# Patient Record
Sex: MALE | Race: WHITE | NOT HISPANIC OR LATINO | URBAN - METROPOLITAN AREA
[De-identification: names, ages, dates, MRNs, and addresses within clinical notes are randomized per-mention and may not be internally consistent; named-entity substitution may affect disease eponyms.]

---

## 2018-01-06 ENCOUNTER — INPATIENT (INPATIENT)
Facility: HOSPITAL | Age: 83
LOS: 4 days | Discharge: NURSING, REHAB OR LONG-TERM CARE | DRG: 593 | End: 2018-01-11
Attending: INTERNAL MEDICINE | Admitting: INTERNAL MEDICINE
Payer: MEDICARE

## 2018-01-06 VITALS
SYSTOLIC BLOOD PRESSURE: 160 MMHG | DIASTOLIC BLOOD PRESSURE: 78 MMHG | HEART RATE: 76 BPM | WEIGHT: 175.05 LBS | OXYGEN SATURATION: 99 % | TEMPERATURE: 98 F | RESPIRATION RATE: 18 BRPM | HEIGHT: 70 IN

## 2018-01-06 NOTE — ED ADULT NURSE NOTE - ED STAT RN HANDOFF DETAILS 2
Report received from BRANDON Proctor RN. Patient resting in bed. AA&Ox3. Breathing on room air. IVHL intact.

## 2018-01-07 DIAGNOSIS — L03.90 CELLULITIS, UNSPECIFIED: ICD-10-CM

## 2018-01-07 DIAGNOSIS — Z29.9 ENCOUNTER FOR PROPHYLACTIC MEASURES, UNSPECIFIED: ICD-10-CM

## 2018-01-07 DIAGNOSIS — L89.90 PRESSURE ULCER OF UNSPECIFIED SITE, UNSPECIFIED STAGE: ICD-10-CM

## 2018-01-07 DIAGNOSIS — Z86.79 PERSONAL HISTORY OF OTHER DISEASES OF THE CIRCULATORY SYSTEM: ICD-10-CM

## 2018-01-07 LAB
ALBUMIN SERPL ELPH-MCNC: 3.3 G/DL — LOW (ref 3.5–5)
ALP SERPL-CCNC: 103 U/L — SIGNIFICANT CHANGE UP (ref 40–120)
ALT FLD-CCNC: 13 U/L DA — SIGNIFICANT CHANGE UP (ref 10–60)
ANION GAP SERPL CALC-SCNC: 6 MMOL/L — SIGNIFICANT CHANGE UP (ref 5–17)
APTT BLD: 27.7 SEC — SIGNIFICANT CHANGE UP (ref 27.5–37.4)
AST SERPL-CCNC: 19 U/L — SIGNIFICANT CHANGE UP (ref 10–40)
BASOPHILS NFR BLD AUTO: 1 % — SIGNIFICANT CHANGE UP (ref 0–2)
BILIRUB SERPL-MCNC: 0.2 MG/DL — SIGNIFICANT CHANGE UP (ref 0.2–1.2)
BUN SERPL-MCNC: 18 MG/DL — SIGNIFICANT CHANGE UP (ref 7–18)
CALCIUM SERPL-MCNC: 8.7 MG/DL — SIGNIFICANT CHANGE UP (ref 8.4–10.5)
CHLORIDE SERPL-SCNC: 103 MMOL/L — SIGNIFICANT CHANGE UP (ref 96–108)
CO2 SERPL-SCNC: 31 MMOL/L — SIGNIFICANT CHANGE UP (ref 22–31)
CREAT SERPL-MCNC: 0.62 MG/DL — SIGNIFICANT CHANGE UP (ref 0.5–1.3)
EOSINOPHIL NFR BLD AUTO: 1 % — SIGNIFICANT CHANGE UP (ref 0–6)
GLUCOSE SERPL-MCNC: 93 MG/DL — SIGNIFICANT CHANGE UP (ref 70–99)
HCT VFR BLD CALC: 32.8 % — LOW (ref 39–50)
HGB BLD-MCNC: 10.1 G/DL — LOW (ref 13–17)
INR BLD: 1 RATIO — SIGNIFICANT CHANGE UP (ref 0.88–1.16)
LACTATE SERPL-SCNC: 1 MMOL/L — SIGNIFICANT CHANGE UP (ref 0.7–2)
LYMPHOCYTES # BLD AUTO: 46 % — HIGH (ref 13–44)
MCHC RBC-ENTMCNC: 29.2 PG — SIGNIFICANT CHANGE UP (ref 27–34)
MCHC RBC-ENTMCNC: 30.7 GM/DL — LOW (ref 32–36)
MCV RBC AUTO: 95.3 FL — SIGNIFICANT CHANGE UP (ref 80–100)
MONOCYTES NFR BLD AUTO: 5 % — SIGNIFICANT CHANGE UP (ref 2–14)
NEUTROPHILS NFR BLD AUTO: 44 % — SIGNIFICANT CHANGE UP (ref 43–77)
PLATELET # BLD AUTO: 387 K/UL — SIGNIFICANT CHANGE UP (ref 150–400)
POTASSIUM SERPL-MCNC: 4.1 MMOL/L — SIGNIFICANT CHANGE UP (ref 3.5–5.3)
POTASSIUM SERPL-SCNC: 4.1 MMOL/L — SIGNIFICANT CHANGE UP (ref 3.5–5.3)
PROT SERPL-MCNC: 6.6 G/DL — SIGNIFICANT CHANGE UP (ref 6–8.3)
PROTHROM AB SERPL-ACNC: 10.9 SEC — SIGNIFICANT CHANGE UP (ref 9.8–12.7)
RBC # BLD: 3.44 M/UL — LOW (ref 4.2–5.8)
RBC # FLD: 15.4 % — HIGH (ref 10.3–14.5)
SODIUM SERPL-SCNC: 140 MMOL/L — SIGNIFICANT CHANGE UP (ref 135–145)
WBC # BLD: 14.7 K/UL — HIGH (ref 3.8–10.5)
WBC # FLD AUTO: 14.7 K/UL — HIGH (ref 3.8–10.5)

## 2018-01-07 PROCEDURE — 73502 X-RAY EXAM HIP UNI 2-3 VIEWS: CPT | Mod: 26,RT

## 2018-01-07 PROCEDURE — 99285 EMERGENCY DEPT VISIT HI MDM: CPT | Mod: 25

## 2018-01-07 RX ORDER — PIPERACILLIN AND TAZOBACTAM 4; .5 G/20ML; G/20ML
3.38 INJECTION, POWDER, LYOPHILIZED, FOR SOLUTION INTRAVENOUS ONCE
Qty: 0 | Refills: 0 | Status: COMPLETED | OUTPATIENT
Start: 2018-01-07 | End: 2018-01-07

## 2018-01-07 RX ORDER — FERROUS SULFATE 325(65) MG
0 TABLET ORAL
Qty: 0 | Refills: 0 | COMMUNITY

## 2018-01-07 RX ORDER — FERROUS SULFATE 325(65) MG
325 TABLET ORAL DAILY
Qty: 0 | Refills: 0 | Status: DISCONTINUED | OUTPATIENT
Start: 2018-01-07 | End: 2018-01-11

## 2018-01-07 RX ORDER — ZINC GLUCONATE 30 MG
0 TABLET ORAL
Qty: 0 | Refills: 0 | COMMUNITY

## 2018-01-07 RX ORDER — LISINOPRIL 2.5 MG/1
10 TABLET ORAL DAILY
Qty: 0 | Refills: 0 | Status: DISCONTINUED | OUTPATIENT
Start: 2018-01-07 | End: 2018-01-11

## 2018-01-07 RX ORDER — FUROSEMIDE 40 MG
0 TABLET ORAL
Qty: 0 | Refills: 0 | COMMUNITY

## 2018-01-07 RX ORDER — AMPICILLIN SODIUM AND SULBACTAM SODIUM 250; 125 MG/ML; MG/ML
3 INJECTION, POWDER, FOR SUSPENSION INTRAMUSCULAR; INTRAVENOUS EVERY 6 HOURS
Qty: 0 | Refills: 0 | Status: DISCONTINUED | OUTPATIENT
Start: 2018-01-07 | End: 2018-01-11

## 2018-01-07 RX ORDER — VANCOMYCIN HCL 1 G
1000 VIAL (EA) INTRAVENOUS ONCE
Qty: 0 | Refills: 0 | Status: COMPLETED | OUTPATIENT
Start: 2018-01-07 | End: 2018-01-07

## 2018-01-07 RX ORDER — LIDOCAINE 4 G/100G
1 CREAM TOPICAL DAILY
Qty: 0 | Refills: 0 | Status: DISCONTINUED | OUTPATIENT
Start: 2018-01-07 | End: 2018-01-11

## 2018-01-07 RX ORDER — TAMSULOSIN HYDROCHLORIDE 0.4 MG/1
0 CAPSULE ORAL
Qty: 0 | Refills: 0 | COMMUNITY

## 2018-01-07 RX ORDER — AMPICILLIN SODIUM AND SULBACTAM SODIUM 250; 125 MG/ML; MG/ML
INJECTION, POWDER, FOR SUSPENSION INTRAMUSCULAR; INTRAVENOUS
Qty: 0 | Refills: 0 | Status: DISCONTINUED | OUTPATIENT
Start: 2018-01-07 | End: 2018-01-07

## 2018-01-07 RX ORDER — LISINOPRIL 2.5 MG/1
0 TABLET ORAL
Qty: 0 | Refills: 0 | COMMUNITY

## 2018-01-07 RX ORDER — TAMSULOSIN HYDROCHLORIDE 0.4 MG/1
0.4 CAPSULE ORAL AT BEDTIME
Qty: 0 | Refills: 0 | Status: DISCONTINUED | OUTPATIENT
Start: 2018-01-07 | End: 2018-01-11

## 2018-01-07 RX ORDER — AMPICILLIN SODIUM AND SULBACTAM SODIUM 250; 125 MG/ML; MG/ML
3 INJECTION, POWDER, FOR SUSPENSION INTRAMUSCULAR; INTRAVENOUS ONCE
Qty: 0 | Refills: 0 | Status: DISCONTINUED | OUTPATIENT
Start: 2018-01-07 | End: 2018-01-07

## 2018-01-07 RX ORDER — FUROSEMIDE 40 MG
20 TABLET ORAL DAILY
Qty: 0 | Refills: 0 | Status: DISCONTINUED | OUTPATIENT
Start: 2018-01-07 | End: 2018-01-11

## 2018-01-07 RX ORDER — ZINC GLUCONATE 30 MG
50 TABLET ORAL
Qty: 0 | Refills: 0 | Status: DISCONTINUED | OUTPATIENT
Start: 2018-01-07 | End: 2018-01-07

## 2018-01-07 RX ORDER — ZINC SULFATE TAB 220 MG (50 MG ZINC EQUIVALENT) 220 (50 ZN) MG
220 TAB ORAL
Qty: 0 | Refills: 0 | Status: DISCONTINUED | OUTPATIENT
Start: 2018-01-07 | End: 2018-01-11

## 2018-01-07 RX ORDER — AMPICILLIN SODIUM AND SULBACTAM SODIUM 250; 125 MG/ML; MG/ML
3 INJECTION, POWDER, FOR SUSPENSION INTRAMUSCULAR; INTRAVENOUS ONCE
Qty: 0 | Refills: 0 | Status: COMPLETED | OUTPATIENT
Start: 2018-01-07 | End: 2018-01-07

## 2018-01-07 RX ORDER — ENOXAPARIN SODIUM 100 MG/ML
40 INJECTION SUBCUTANEOUS DAILY
Qty: 0 | Refills: 0 | Status: DISCONTINUED | OUTPATIENT
Start: 2018-01-07 | End: 2018-01-11

## 2018-01-07 RX ORDER — AMPICILLIN SODIUM AND SULBACTAM SODIUM 250; 125 MG/ML; MG/ML
3 INJECTION, POWDER, FOR SUSPENSION INTRAMUSCULAR; INTRAVENOUS EVERY 6 HOURS
Qty: 0 | Refills: 0 | Status: DISCONTINUED | OUTPATIENT
Start: 2018-01-07 | End: 2018-01-07

## 2018-01-07 RX ADMIN — Medication 20 MILLIGRAM(S): at 13:04

## 2018-01-07 RX ADMIN — TAMSULOSIN HYDROCHLORIDE 0.4 MILLIGRAM(S): 0.4 CAPSULE ORAL at 21:14

## 2018-01-07 RX ADMIN — Medication 325 MILLIGRAM(S): at 13:04

## 2018-01-07 RX ADMIN — PIPERACILLIN AND TAZOBACTAM 100 GRAM(S): 4; .5 INJECTION, POWDER, LYOPHILIZED, FOR SOLUTION INTRAVENOUS at 06:15

## 2018-01-07 RX ADMIN — LIDOCAINE 1 PATCH: 4 CREAM TOPICAL at 13:04

## 2018-01-07 RX ADMIN — ENOXAPARIN SODIUM 40 MILLIGRAM(S): 100 INJECTION SUBCUTANEOUS at 13:04

## 2018-01-07 RX ADMIN — AMPICILLIN SODIUM AND SULBACTAM SODIUM 500 GRAM(S): 250; 125 INJECTION, POWDER, FOR SUSPENSION INTRAMUSCULAR; INTRAVENOUS at 13:04

## 2018-01-07 RX ADMIN — Medication 250 MILLIGRAM(S): at 09:26

## 2018-01-07 RX ADMIN — AMPICILLIN SODIUM AND SULBACTAM SODIUM 500 GRAM(S): 250; 125 INJECTION, POWDER, FOR SUSPENSION INTRAMUSCULAR; INTRAVENOUS at 17:40

## 2018-01-07 RX ADMIN — LISINOPRIL 10 MILLIGRAM(S): 2.5 TABLET ORAL at 13:04

## 2018-01-07 NOTE — ED PROVIDER NOTE - SKIN WOUND TYPE
PRESSURE ULCER(S)/(+) edge of surgical wound on R hip: 3mm opening with surrounding erythema, pressure ulcer over sacrum PRESSURE ULCER(S)/(+) erythema edge of surgical wound on R hip: 3mm opening with surrounding erythema(2cm), pressure ulcer over sacrum

## 2018-01-07 NOTE — ED PROVIDER NOTE - OBJECTIVE STATEMENT
89 y/o male with significant PMHx of HTN, BIB EMS from [facility] to [facility] ED to r/o R hip/buttock abscess x today. Today wife got call from NH: stating that during dressing of surgical wound on R hip, there was drainage from R hip surgical wound, and was bought to NH to r/o abscess. Wife relates on 11/7/2017 pt had partial hip replacement at [facility], and had a complication with PNA and episode of sepsis 5 days s/p surgery. Wife states pt was admitted at CCU was put on chest tube for secretions due to PNA, and had fluid drained for 5 days. On 11/28/17 s/p removal of chest tube, pt was in [facility] for on physical therapy for R knee which as been chronically pain for many years. Pt denies fever, chills, CP, SOB, or any other complaints. PMD: Dr. Brown 89 y/o male with significant PMHx of HTN, CLL (WBC normally between [id number]) BIB EMS from NH to the ED to r/o R hip/buttock wound x today. Today wife got call from NH: stating that during dressing of surgical wound on R hip, there was drainage from R hip surgical wound, and was bought to NH to r/o abscess. Wife relates on 11/7/2017 pt had partial hip replacement at [facility], and had a complication with PNA and episode of sepsis 5 days s/p surgery. Wife states pt was admitted at CCU was put on chest tube for secretions due to PNA, and had fluid drained for 5 days. On 11/28/17 s/p removal of chest tube, pt was in [facility] for on physical therapy for R knee which as been chronically pain for many years. Pt denies fever, chills, CP, SOB, or any other complaints. PMD: Dr. Brown 89 y/o male with significant PMHx of HTN, CLL (WBC normally between 11-71072) BIB EMS from NH to the ED to r/o R hip/buttock wound x today. Today wife got call from NH: stating that during dressing of surgical wound on R hip, there was drainage from R hip surgical wound, and was bought to NH to r/o abscess. Wife relates on 11/7/2017 pt had partial hip replacement at [facility], and had a complication with PNA and episode of sepsis 5 days s/p surgery. Wife states pt was admitted at CCU was put on chest tube for secretions due to PNA, and had fluid drained for 5 days. On 11/28/17 s/p removal of chest tube, pt was in [facility] for on physical therapy for R knee which as been chronically pain for many years. Pt denies fever, chills, CP, SOB, or any other complaints. Orthopedist: Dr. Brown

## 2018-01-07 NOTE — H&P ADULT - NSHPPHYSICALEXAM_GEN_ALL_CORE
PHYSICAL EXAM:    GENERAL: NAD, well-developed    HEAD:  Atraumatic, Normocephalic    EYES: EOMI, PERRLA, conjunctiva and sclera clear    NECK: Supple, No JVD    CHEST/LUNG: Clear to auscultation bilaterally; No wheeze    HEART: Regular rate and rhythm; No murmurs, rubs, or gallops    ABDOMEN: Soft, Nontender, Nondistended; Bowel sounds present    EXTREMITIES:  2+ Peripheral Pulses, No clubbing, cyanosis, or edema    PSYCH: AAOx3    NEUROLOGY: non-focal    SKIN: right hip stage 3 pressure ulcer 2x2 cm with minimal purulent discharge and upper back stage 2 pressure ulcer 1x2 cm without any discharge    No other pertinent positive finding except mentioned as above.

## 2018-01-07 NOTE — H&P ADULT - ASSESSMENT
89 y/o male from [facility] with significant PMHx of HTN, CLL (WBC normally between [id number]) BIB EMS from NH to the ED to r/o R hip/buttock wound x today

## 2018-01-07 NOTE — H&P ADULT - NSHPREVIEWOFSYSTEMS_GEN_ALL_CORE
REVIEW OF SYSTEMS:    CONSTITUTIONAL: No  fevers or chills  EYES/ENT: No visual changes;  No vertigo or throat pain   NECK: No pain or stiffness  RESPIRATORY: No cough, wheezing, hemoptysis; No shortness of breath  CARDIOVASCULAR: No chest pain or palpitations  GASTROINTESTINAL: No abdominal or epigastric pain. No nausea, vomiting, or hematemesis; No diarrhea or constipation. No melena or hematochezia.  GENITOURINARY: No dysuria, frequency or hematuria  NEUROLOGICAL: No numbness or weakness  SKIN: No itching, rashes, 2 pressure ulcer at the back and hip as mentioned above, scar from hip surgery heeled well  No other complaint except mentioned as above.

## 2018-01-07 NOTE — H&P ADULT - NSHPLABSRESULTS_GEN_ALL_CORE
Vital Signs Last 24 Hrs  T(C): 36.6 (07 Jan 2018 10:35), Max: 37.1 (07 Jan 2018 00:51)  T(F): 97.8 (07 Jan 2018 10:35), Max: 98.8 (07 Jan 2018 00:51)  HR: 81 (07 Jan 2018 10:35) (76 - 81)  BP: 148/49 (07 Jan 2018 10:35) (118/41 - 160/78)  BP(mean): --  RR: 18 (07 Jan 2018 10:35) (18 - 18)  SpO2: 98% (07 Jan 2018 10:35) (98% - 100%)      Labs:                        10.1   14.7  )-----------( 387      ( 07 Jan 2018 01:27 )             32.8     01-07    140  |  103  |  18  ----------------------------<  93  4.1   |  31  |  0.62    Ca    8.7      07 Jan 2018 01:27    TPro  6.6  /  Alb  3.3<L>  /  TBili  0.2  /  DBili  x   /  AST  19  /  ALT  13  /  AlkPhos  103  01-07

## 2018-01-07 NOTE — H&P ADULT - HISTORY OF PRESENT ILLNESS
89 y/o male from [facility] with significant PMHx of HTN, CLL (WBC normally between [id number]) BIB EMS from [facility] to the ED to r/o R hip/buttock wound x today. Patient noticed serous drainage from his right hip wound today during dressing and daughter was notified and patient was brought in to ED. He also admit having another pressure ulcer at the middle of the back.   Daughter stated that on 11/7/2017 pt had partial hip replacement at [facility], and had a complication with PNA and episode of sepsis 5 days s/p surgery. Pt was admitted at CCU was put on chest tube for secretions due to PNA, and had fluid drained for 5 days. On 11/28/17 s/p removal of chest tube, pt was in [facility] for on physical therapy for R knee which as been chronically pain for many years from arthritis. He is improving and using rollator at rehab.    Pt denies fever, chills, CP, SOB, or any other complaints. Orthopedist: Dr. Brown

## 2018-01-07 NOTE — H&P ADULT - PROBLEM SELECTOR PLAN 1
-stage 3 hip pressure ulcer and stage 2 upper back ulcer  -WBC 14 (likely from underlying CLL) denied any fever  Will give Unasyn, s/p vancomycin and zosyn in ED  -f/u BCx and wound care  -frequent turning every 2 hour

## 2018-01-08 LAB
ANION GAP SERPL CALC-SCNC: 7 MMOL/L — SIGNIFICANT CHANGE UP (ref 5–17)
BASOPHILS # BLD AUTO: 0.1 K/UL — SIGNIFICANT CHANGE UP (ref 0–0.2)
BASOPHILS NFR BLD AUTO: 1 % — SIGNIFICANT CHANGE UP (ref 0–2)
BUN SERPL-MCNC: 12 MG/DL — SIGNIFICANT CHANGE UP (ref 7–18)
CALCIUM SERPL-MCNC: 8.6 MG/DL — SIGNIFICANT CHANGE UP (ref 8.4–10.5)
CHLORIDE SERPL-SCNC: 102 MMOL/L — SIGNIFICANT CHANGE UP (ref 96–108)
CHOLEST SERPL-MCNC: 157 MG/DL — SIGNIFICANT CHANGE UP (ref 10–199)
CO2 SERPL-SCNC: 30 MMOL/L — SIGNIFICANT CHANGE UP (ref 22–31)
CREAT SERPL-MCNC: 0.64 MG/DL — SIGNIFICANT CHANGE UP (ref 0.5–1.3)
EOSINOPHIL # BLD AUTO: 0.3 K/UL — SIGNIFICANT CHANGE UP (ref 0–0.5)
EOSINOPHIL NFR BLD AUTO: 2.3 % — SIGNIFICANT CHANGE UP (ref 0–6)
ERYTHROCYTE [SEDIMENTATION RATE] IN BLOOD: 35 MM/HR — HIGH (ref 0–20)
GLUCOSE SERPL-MCNC: 86 MG/DL — SIGNIFICANT CHANGE UP (ref 70–99)
HBA1C BLD-MCNC: 4.8 % — SIGNIFICANT CHANGE UP (ref 4–5.6)
HCT VFR BLD CALC: 30.2 % — LOW (ref 39–50)
HDLC SERPL-MCNC: 52 MG/DL — SIGNIFICANT CHANGE UP (ref 40–125)
HGB BLD-MCNC: 9.7 G/DL — LOW (ref 13–17)
LIPID PNL WITH DIRECT LDL SERPL: 89 MG/DL — SIGNIFICANT CHANGE UP
LYMPHOCYTES # BLD AUTO: 55.6 % — HIGH (ref 13–44)
LYMPHOCYTES # BLD AUTO: 6.4 K/UL — HIGH (ref 1–3.3)
MAGNESIUM SERPL-MCNC: 2.2 MG/DL — SIGNIFICANT CHANGE UP (ref 1.6–2.6)
MCHC RBC-ENTMCNC: 30.8 PG — SIGNIFICANT CHANGE UP (ref 27–34)
MCHC RBC-ENTMCNC: 32 GM/DL — SIGNIFICANT CHANGE UP (ref 32–36)
MCV RBC AUTO: 96.3 FL — SIGNIFICANT CHANGE UP (ref 80–100)
MONOCYTES # BLD AUTO: 0.6 K/UL — SIGNIFICANT CHANGE UP (ref 0–0.9)
MONOCYTES NFR BLD AUTO: 5.4 % — SIGNIFICANT CHANGE UP (ref 2–14)
NEUTROPHILS # BLD AUTO: 4.1 K/UL — SIGNIFICANT CHANGE UP (ref 1.8–7.4)
NEUTROPHILS NFR BLD AUTO: 35.7 % — LOW (ref 43–77)
PHOSPHATE SERPL-MCNC: 3.6 MG/DL — SIGNIFICANT CHANGE UP (ref 2.5–4.5)
PLATELET # BLD AUTO: 347 K/UL — SIGNIFICANT CHANGE UP (ref 150–400)
POTASSIUM SERPL-MCNC: 3.5 MMOL/L — SIGNIFICANT CHANGE UP (ref 3.5–5.3)
POTASSIUM SERPL-SCNC: 3.5 MMOL/L — SIGNIFICANT CHANGE UP (ref 3.5–5.3)
RBC # BLD: 3.14 M/UL — LOW (ref 4.2–5.8)
RBC # FLD: 15.2 % — HIGH (ref 10.3–14.5)
SODIUM SERPL-SCNC: 139 MMOL/L — SIGNIFICANT CHANGE UP (ref 135–145)
TOTAL CHOLESTEROL/HDL RATIO MEASUREMENT: 3 RATIO — LOW (ref 3.4–9.6)
TRIGL SERPL-MCNC: 79 MG/DL — SIGNIFICANT CHANGE UP (ref 10–149)
TSH SERPL-MCNC: 2.23 UU/ML — SIGNIFICANT CHANGE UP (ref 0.34–4.82)
WBC # BLD: 11.5 K/UL — HIGH (ref 3.8–10.5)
WBC # FLD AUTO: 11.5 K/UL — HIGH (ref 3.8–10.5)

## 2018-01-08 RX ORDER — VANCOMYCIN HCL 1 G
1000 VIAL (EA) INTRAVENOUS ONCE
Qty: 0 | Refills: 0 | Status: COMPLETED | OUTPATIENT
Start: 2018-01-08 | End: 2018-01-08

## 2018-01-08 RX ORDER — VANCOMYCIN HCL 1 G
VIAL (EA) INTRAVENOUS
Qty: 0 | Refills: 0 | Status: DISCONTINUED | OUTPATIENT
Start: 2018-01-08 | End: 2018-01-09

## 2018-01-08 RX ORDER — IBUPROFEN 200 MG
400 TABLET ORAL EVERY 4 HOURS
Qty: 0 | Refills: 0 | Status: DISCONTINUED | OUTPATIENT
Start: 2018-01-08 | End: 2018-01-11

## 2018-01-08 RX ORDER — VANCOMYCIN HCL 1 G
1000 VIAL (EA) INTRAVENOUS EVERY 24 HOURS
Qty: 0 | Refills: 0 | Status: DISCONTINUED | OUTPATIENT
Start: 2018-01-09 | End: 2018-01-09

## 2018-01-08 RX ADMIN — Medication 250 MILLIGRAM(S): at 18:11

## 2018-01-08 RX ADMIN — AMPICILLIN SODIUM AND SULBACTAM SODIUM 500 GRAM(S): 250; 125 INJECTION, POWDER, FOR SUSPENSION INTRAMUSCULAR; INTRAVENOUS at 23:11

## 2018-01-08 RX ADMIN — TAMSULOSIN HYDROCHLORIDE 0.4 MILLIGRAM(S): 0.4 CAPSULE ORAL at 23:11

## 2018-01-08 RX ADMIN — AMPICILLIN SODIUM AND SULBACTAM SODIUM 500 GRAM(S): 250; 125 INJECTION, POWDER, FOR SUSPENSION INTRAMUSCULAR; INTRAVENOUS at 00:35

## 2018-01-08 RX ADMIN — ZINC SULFATE TAB 220 MG (50 MG ZINC EQUIVALENT) 220 MILLIGRAM(S): 220 (50 ZN) TAB at 12:06

## 2018-01-08 RX ADMIN — AMPICILLIN SODIUM AND SULBACTAM SODIUM 500 GRAM(S): 250; 125 INJECTION, POWDER, FOR SUSPENSION INTRAMUSCULAR; INTRAVENOUS at 05:37

## 2018-01-08 RX ADMIN — AMPICILLIN SODIUM AND SULBACTAM SODIUM 500 GRAM(S): 250; 125 INJECTION, POWDER, FOR SUSPENSION INTRAMUSCULAR; INTRAVENOUS at 17:29

## 2018-01-08 RX ADMIN — Medication 20 MILLIGRAM(S): at 05:37

## 2018-01-08 RX ADMIN — Medication 325 MILLIGRAM(S): at 12:06

## 2018-01-08 RX ADMIN — LIDOCAINE 1 PATCH: 4 CREAM TOPICAL at 12:06

## 2018-01-08 RX ADMIN — AMPICILLIN SODIUM AND SULBACTAM SODIUM 500 GRAM(S): 250; 125 INJECTION, POWDER, FOR SUSPENSION INTRAMUSCULAR; INTRAVENOUS at 12:06

## 2018-01-08 RX ADMIN — LISINOPRIL 10 MILLIGRAM(S): 2.5 TABLET ORAL at 05:37

## 2018-01-08 RX ADMIN — Medication 400 MILLIGRAM(S): at 23:41

## 2018-01-08 RX ADMIN — ENOXAPARIN SODIUM 40 MILLIGRAM(S): 100 INJECTION SUBCUTANEOUS at 12:06

## 2018-01-08 RX ADMIN — LIDOCAINE 1 PATCH: 4 CREAM TOPICAL at 00:32

## 2018-01-08 NOTE — CONSULT NOTE ADULT - SUBJECTIVE AND OBJECTIVE BOX
HPI:  89 y/o male from [facility] with significant PMHx of HTN, CLL (WBC normally between [id number]) BIB EMS from NH to the ED to r/o R hip/buttock wound x today. Patient noticed serous drainage from his right hip wound today during dressing and daughter was notified and patient was brought in to ED. He also admit having another pressure ulcer at the middle of the back.   Daughter stated that on 11/7/2017 pt had partial hip replacement at [facility], and had a complication with PNA and episode of sepsis 5 days s/p surgery. Pt was admitted at CCU was put on chest tube for secretions due to PNA, and had fluid drained for 5 days. On 11/28/17 s/p removal of chest tube, pt was in [facility] for on physical therapy for R knee which as been chronically pain for many years from arthritis. He is improving and using rollator at rehab.    Pt denies fever, chills, CP, SOB, or any other complaints. Orthopedist: Dr. Brown (07 Jan 2018 11:02). ID called for eval of infected  right hip wound. denies any pain over right hip wound , no fevers no chills . afebrile on adm with leucocytosis , xray of right hip with no dislocation or loosening .       PAST MEDICAL & SURGICAL HISTORY:  History of hypertension  No significant past surgical history    allergy   No Known Allergies    meds   ampicillin/sulbactam  IVPB 3 Gram(s) IV Intermittent every 6 hours  enoxaparin Injectable 40 milliGRAM(s) SubCutaneous daily  ferrous    sulfate 325 milliGRAM(s) Oral daily  furosemide    Tablet 20 milliGRAM(s) Oral daily  lidocaine   Patch 1 Patch Transdermal daily  lisinopril 10 milliGRAM(s) Oral daily  tamsulosin 0.4 milliGRAM(s) Oral at bedtime  zinc sulfate 220 milliGRAM(s) Oral <User Schedule>      Social Hx:no smoking no etoh     FAMILY HISTORY: non contributory         ROS  [  ] UNABLE TO ELICIT    General:  [ - ] Fever   [-  ] Malaise    Skin: right hip wound draining /also with other decubiti over back     HEENT:  [ - ] Sore Throat  [ - ] Photophobia	    Chest: [ - ] SOB  [ - ] Cough     Cardiovascular: [ - ] CP	no pnd no orthopnea     Gastrointestinal: [  -] Abd pain   [-  ] N    [ - ] V   [ - ] Diarrhea	    Genitourinary: [ - ] Polyuria   [ - ] Urgency   [ - ] Dysuria    [ - ]  Hematuria	    Musculoskeletal:  [ - ] Back Pain	    Neurological: [ - ]Dizziness  [-  ]Visual Disturbance  [ - ]Headaches      PHYSICAL EXAM:    Vital Signs Last 24 Hrs  T(C): 36.3 (08 Jan 2018 14:10), Max: 36.7 (07 Jan 2018 22:47)  T(F): 97.3 (08 Jan 2018 14:10), Max: 98.1 (07 Jan 2018 22:47)  HR: 77 (08 Jan 2018 14:10) (67 - 77)  BP: 111/46 (08 Jan 2018 14:10) (99/62 - 158/53)  BP(mean): --  RR: 18 (08 Jan 2018 14:10) (17 - 18)  SpO2: 95% (08 Jan 2018 14:10) (95% - 99%)    Constitutional: awake alert oriented times 2-3    PRAVEEN SCLERA anicteric EOMI   LUNGS clear   CVS s1 s2 aud no murmurs   ABDOMEN soft non tender no HSM   NEUROLOGY  no defecits   SKIN right post thoracic area with stage 2-3 clean du /sacral du c;lean /left heel with stage 3 du clean   EXTREMITIES no edema no cyanosis /right hip wound with pin pt opening with min serous d/c         LABS/DIAGNOSTIC TESTS                          9.7    11.5  )-----------( 347      ( 08 Jan 2018 07:33 )             30.2     WBC Count: 11.5 K/uL (01-08 @ 07:33)  WBC Count: 14.7 K/uL (01-07 @ 01:27)      01-08    139  |  102  |  12  ----------------------------<  86  3.5   |  30  |  0.64    Ca    8.6      08 Jan 2018 07:33  Phos  3.6     01-08  Mg     2.2     01-08    TPro  6.6  /  Alb  3.3<L>  /  TBili  0.2  /  DBili  x   /  AST  19  /  ALT  13  /  AlkPhos  103  01-07          LIVER FUNCTIONS - ( 07 Jan 2018 01:27 )  Alb: 3.3 g/dL / Pro: 6.6 g/dL / ALK PHOS: 103 U/L / ALT: 13 U/L DA / AST: 19 U/L / GGT: x             PT/INR - ( 07 Jan 2018 01:27 )   PT: 10.9 sec;   INR: 1.00 ratio         PTT - ( 07 Jan 2018 01:27 )  PTT:27.7 sec    LACTATE: Lactate, Blood (01.07.18 @ 01:27)    Lactate, Blood: 1.0 mmol/L          Culture Results:   No growth to date. (01-07 @ 12:07)blood         RADIOLOGY    < from: Xray Hip w/ Pelvis 2 or 3 Views, Right (01.07.18 @ 06:18) >    EXAM:  XR HIP WITH PELV 2-3V RT                            PROCEDURE DATE:  01/07/2018          INTERPRETATION:  History: Hip pain.    Findings: Frontal pelvis and frontal and abducted views of the right hip.    No prior studies for comparison.    Patient has history of right hip arthroplasty. Prosthesis is intact with   good articulation. No periprosthetic fracture seen. There is decreased   bony mineralization commensurate with patient's age. Degenerative changes   visualized lower lumbar spine.    Impression:    History of right hip arthroplasty. Prosthesis is intact with good   articulation. No acute fractures.    < end of copied text >

## 2018-01-08 NOTE — DISCHARGE NOTE ADULT - PLAN OF CARE
Local Wound care and Oral antibiotics Admitted to the floor due suspected wound infection. Evaluated by wound care nurse specialist.  There is a healing Surgical Dehiscence to the R. Hip (0.2cm x 0.2cm with red tissue, pale tissue, and scant drainage. There is a Stage 1 Pressure Injury to the Coccyx; presenting as non-blanchable erythema  There is an Unstageable Pressure Injury to the R. Gluteus (6cm x 4cm) and R. Heel (2cm x 2cm) with slough, pink tissue, and scant drainage.  Clean all wounds with normal saline and apply skin prep to the surrounding skin  Apply Hydrocolloid dressing (Comfeel) to the R. Heel and R. Gluteal wound bed and cover with a Foam dressing Q 72hrs PRN  Apply a Foam dressing to the R. Hip wound Q 72hrs PRN  Apply Zinc Oxide Moisture Barrier Cream to the Perianal and Bilateral Gluteal areas b.i.d. PRN  Elevate/float the patients heels using heel protectors and reposition the patient Q 2hrs using wedges or pillows Continue with blood pressure medication. Maintain a healthy diet that consist of low sugar, low fat, low sodium diet. Exercise frequently if possible.  Follow up with primary care physician in one week after discharge. Admitted to the floor due suspected wound infection. Evaluated by wound care nurse specialist.  There is a healing Surgical Dehiscence to the R. Hip. Stage 1 Pressure Injury to the Coccyx; presenting as non-blanchable erythema and unstageable pressure Injury to the R. Gluteus with slough, pink tissue, and scant drainage. Reccomened clean all wounds with normal saline and apply skin prep to the surrounding skin.  Apply Hydrocolloid dressing (Comfeel) to the R. Heel and R. Gluteal wound bed and cover with a Foam dressing Q 72hrs PRN. Apply a Foam dressing to the R. Hip wound Q 72hrs PRN  Apply Zinc Oxide Moisture Barrier Cream to the Perianal and Bilateral Gluteal areas b.i.d. PRN  Elevate/float the patients heels using heel protectors and reposition the patient Q 2hrs using wedges or pillows. Ambulation is adviced.  Will complete a course of oral antibiotics Augmentin for a total of 10 days. Admitted to the floor due suspected wound infection. Evaluated by wound care nurse specialist.  There is a healing Surgical Dehiscence to the R. Hip. Stage 1 Pressure Injury to the Coccyx; presenting as non-blanchable erythema and unstageable pressure Injury to the R. Gluteus with slough, pink tissue, and scant drainage.     Recommendations include: clean all wounds with normal saline and apply skin prep to the surrounding skin.  Apply Hydrocolloid dressing (Comfeel) to the R. Heel and R. Gluteal wound bed and cover with a Foam dressing Q 72hrs PRN. Apply a Foam dressing to the R. Hip wound Q 72hrs PRN  Apply Zinc Oxide Moisture Barrier Cream to the Perianal and Bilateral Gluteal areas b.i.d. PRN  Elevate/float the patients heels using heel protectors and reposition the patient Q 2hrs using wedges or pillows. Ambulation is adviced.  As per Infectious disease specialist. Will complete a course of oral antibiotics  for a total of 7 more days Doxycyline 100mg oral two times a day

## 2018-01-08 NOTE — DISCHARGE NOTE ADULT - PATIENT PORTAL LINK FT
“You can access the Follow[facility] Patient Portal, offered by [facility], by registering with the following website: [email or web address]”

## 2018-01-08 NOTE — CONSULT NOTE ADULT - ASSESSMENT
89 y/o male from [facility] with significant PMHx of HTN, CLL (WBC normally between [id number]) BIB EMS from [facility] to the ED to r/o R hip/buttock wound x today. Patient noticed serous drainage from his right hip wound today during dressing and daughter was notified and patient was brought in to ED. He also admit having another pressure ulcer at the middle of the back.   Daughter stated that on 11/7/2017 pt had partial hip replacement at [facility], and had a complication with PNA and episode of sepsis 5 days s/p surgery. Pt was admitted at CCU was put on chest tube for secretions due to PNA, and had fluid drained for 5 days. On 11/28/17 s/p removal of chest tube, pt was in [facility] for on physical therapy for R knee which as been chronically pain for many years from arthritis. He is improving and using rollator at rehab.    Pt denies fever, chills, CP, SOB, or any other complaints. Orthopedist: Dr. Brown (07 Jan 2018 11:02). ID called for eval of infected  right hip wound. denies any pain over right hip wound , no fevers no chills . afebrile on adm with leucocytosis , xray of right hip with no dislocation or loosening .     # early right hip dehiscence with min serous drainage for now . xray with no loosening of prosthesis . doubt underlying infected prosthesis     plan  blood cx times 2 f/u   ORTHO F/U OF RIGHT HIP WOUND   may need ct of right hip to r/o underling collection   ESR/CRP   cont unasyn . add iv vanco till ct of right hip done   vanco levels and adjust       thnx will f/u   d/w resident

## 2018-01-08 NOTE — DISCHARGE NOTE ADULT - CARE PLAN
Principal Discharge DX:	Decubitus skin ulcer  Goal:	Local Wound care and Oral antibiotics  Instructions for follow-up, activity and diet:	Admitted to the floor due suspected wound infection. Evaluated by wound care nurse specialist.  There is a healing Surgical Dehiscence to the R. Hip (0.2cm x 0.2cm with red tissue, pale tissue, and scant drainage. There is a Stage 1 Pressure Injury to the Coccyx; presenting as non-blanchable erythema  There is an Unstageable Pressure Injury to the R. Gluteus (6cm x 4cm) and R. Heel (2cm x 2cm) with slough, pink tissue, and scant drainage.  Clean all wounds with normal saline and apply skin prep to the surrounding skin  Apply Hydrocolloid dressing (Comfeel) to the R. Heel and R. Gluteal wound bed and cover with a Foam dressing Q 72hrs PRN  Apply a Foam dressing to the R. Hip wound Q 72hrs PRN  Apply Zinc Oxide Moisture Barrier Cream to the Perianal and Bilateral Gluteal areas b.i.d. PRN  Elevate/float the patients heels using heel protectors and reposition the patient Q 2hrs using wedges or pillows  Secondary Diagnosis:	History of hypertension  Instructions for follow-up, activity and diet:	Continue with blood pressure medication. Maintain a healthy diet that consist of low sugar, low fat, low sodium diet. Exercise frequently if possible.  Follow up with primary care physician in one week after discharge. Principal Discharge DX:	Decubitus skin ulcer  Goal:	Local Wound care and Oral antibiotics  Instructions for follow-up, activity and diet:	Admitted to the floor due suspected wound infection. Evaluated by wound care nurse specialist.  There is a healing Surgical Dehiscence to the R. Hip. Stage 1 Pressure Injury to the Coccyx; presenting as non-blanchable erythema and unstageable pressure Injury to the R. Gluteus with slough, pink tissue, and scant drainage. Reccomened clean all wounds with normal saline and apply skin prep to the surrounding skin.  Apply Hydrocolloid dressing (Comfeel) to the R. Heel and R. Gluteal wound bed and cover with a Foam dressing Q 72hrs PRN. Apply a Foam dressing to the R. Hip wound Q 72hrs PRN  Apply Zinc Oxide Moisture Barrier Cream to the Perianal and Bilateral Gluteal areas b.i.d. PRN  Elevate/float the patients heels using heel protectors and reposition the patient Q 2hrs using wedges or pillows. Ambulation is adviced.  Will complete a course of oral antibiotics Augmentin for a total of 10 days.  Secondary Diagnosis:	History of hypertension  Instructions for follow-up, activity and diet:	Continue with blood pressure medication. Maintain a healthy diet that consist of low sugar, low fat, low sodium diet. Exercise frequently if possible.  Follow up with primary care physician in one week after discharge. Principal Discharge DX:	Decubitus skin ulcer  Goal:	Local Wound care and Oral antibiotics  Instructions for follow-up, activity and diet:	Admitted to the floor due suspected wound infection. Evaluated by wound care nurse specialist.  There is a healing Surgical Dehiscence to the R. Hip. Stage 1 Pressure Injury to the Coccyx; presenting as non-blanchable erythema and unstageable pressure Injury to the R. Gluteus with slough, pink tissue, and scant drainage.     Recommendations include: clean all wounds with normal saline and apply skin prep to the surrounding skin.  Apply Hydrocolloid dressing (Comfeel) to the R. Heel and R. Gluteal wound bed and cover with a Foam dressing Q 72hrs PRN. Apply a Foam dressing to the R. Hip wound Q 72hrs PRN  Apply Zinc Oxide Moisture Barrier Cream to the Perianal and Bilateral Gluteal areas b.i.d. PRN  Elevate/float the patients heels using heel protectors and reposition the patient Q 2hrs using wedges or pillows. Ambulation is adviced.  As per Infectious disease specialist. Will complete a course of oral antibiotics  for a total of 7 more days Doxycyline 100mg oral two times a day  Secondary Diagnosis:	History of hypertension  Instructions for follow-up, activity and diet:	Continue with blood pressure medication. Maintain a healthy diet that consist of low sugar, low fat, low sodium diet. Exercise frequently if possible.  Follow up with primary care physician in one week after discharge. Principal Discharge DX:	Decubitus skin ulcer  Goal:	Local Wound care and Oral antibiotics  Assessment and plan of treatment:	Admitted to the floor due suspected wound infection. Evaluated by wound care nurse specialist.  There is a healing Surgical Dehiscence to the R. Hip. Stage 1 Pressure Injury to the Coccyx; presenting as non-blanchable erythema and unstageable pressure Injury to the R. Gluteus with slough, pink tissue, and scant drainage.     Recommendations include: clean all wounds with normal saline and apply skin prep to the surrounding skin.  Apply Hydrocolloid dressing (Comfeel) to the R. Heel and R. Gluteal wound bed and cover with a Foam dressing Q 72hrs PRN. Apply a Foam dressing to the R. Hip wound Q 72hrs PRN  Apply Zinc Oxide Moisture Barrier Cream to the Perianal and Bilateral Gluteal areas b.i.d. PRN  Elevate/float the patients heels using heel protectors and reposition the patient Q 2hrs using wedges or pillows. Ambulation is adviced.  As per Infectious disease specialist. Will complete a course of oral antibiotics  for a total of 7 more days Doxycyline 100mg oral two times a day  Secondary Diagnosis:	History of hypertension  Assessment and plan of treatment:	Continue with blood pressure medication. Maintain a healthy diet that consist of low sugar, low fat, low sodium diet. Exercise frequently if possible.  Follow up with primary care physician in one week after discharge.

## 2018-01-08 NOTE — PHYSICAL THERAPY INITIAL EVALUATION ADULT - RANGE OF MOTION EXAMINATION, REHAB EVAL
deficits as listed below/Right LE ROM was WFL (within functional limits)/Left LE ROM was WFL (within functional limits)

## 2018-01-08 NOTE — PHYSICAL THERAPY INITIAL EVALUATION ADULT - LEVEL OF INDEPENDENCE: STAIR NEGOTIATION, REHAB EVAL
Unable to safely assess patient on stairs at this time. Pt does not exhibit appropriate pre-requisite skills to safely negotiate unlevel surfaces due to (poor endurance,decreased muscle strength,decreased balance,decreased safety awareness) which will put patient at significant risks for falls and injury./unable to perform

## 2018-01-08 NOTE — DISCHARGE NOTE ADULT - MEDICATION SUMMARY - MEDICATIONS TO TAKE
I will START or STAY ON the medications listed below when I get home from the hospital:    ibuprofen 400 mg oral tablet  -- 1 tab(s) by mouth every 4 hours, As needed, Pain 5-10/10  -- Indication: For Pain    lisinopril 10 mg oral tablet  -- 1 tab(s) by mouth once a day  -- Indication: For Htn    Flomax 0.4 mg oral capsule  -- 1 cap(s) by mouth once a day  -- Indication: For BPH    doxycycline hyclate 100 mg oral capsule  -- 1 cap(s) by mouth 2 times a day   -- Avoid prolonged or excessive exposure to direct and/or artificial sunlight while taking this medication.  Do not take this drug if you are pregnant.  Finish all this medication unless otherwise directed by prescriber.  Medication should be taken with plenty of water.    -- Indication: For Decubitus skin ulcer    Aspercreme with Lidocaine 4% topical film  -- Apply on skin to affected area once a day  -- Indication: For Decubitus skin ulcer    Lasix 20 mg oral tablet  -- 1 tab(s) by mouth once a day  -- Indication: For History of hypertension    ferrous sulfate 325 mg (65 mg elemental iron) oral tablet  -- 1 tab(s) by mouth once a day  -- Indication: For Iron supplementation    zinc (as gluconate) 50 mg oral tablet  -- 1 tab(s) by mouth Monday, Wednesday, and Friday  -- Indication: For Nutritional support    zinc sulfate 220 mg oral capsule  -- 1 cap(s) by mouth   -- Indication: For Nutritional support    Multiple Vitamins oral tablet  -- 1 tab(s) by mouth once a day  -- Indication: For Nutritional support    ascorbic acid 500 mg oral tablet  -- 1 tab(s) by mouth once a day  -- Indication: For Nutritional support

## 2018-01-08 NOTE — PHYSICAL THERAPY INITIAL EVALUATION ADULT - CRITERIA FOR SKILLED THERAPEUTIC INTERVENTIONS
therapy frequency/risk reduction/prevention/rehab potential/functional limitations in following categories/impairments found

## 2018-01-08 NOTE — PROGRESS NOTE ADULT - PROBLEM SELECTOR PLAN 1
Admitted to the floor due suspected wound infection. Evaluated by wound care nurse specialist.   There is a healing Surgical Dehiscence to the R. Hip. Stage 1 Pressure Injury to the Coccyx; presenting as non-blanchable erythema and unstageable pressure Injury to the R. Gluteus with slough, pink tissue, and scant drainage.  Recommendations:   - Clean all wounds with normal saline and apply skin prep to the surrounding skin.    - Apply Hydrocolloid dressing (Comfeel) to the R. Heel and R. Gluteal wound bed and cover with a Foam dressing Q 72hrs PRN. Apply a Foam dressing to the R. Hip wound Q 72hrs PRN  - Apply Zinc Oxide Moisture Barrier Cream to the Perianal and Bilateral Gluteal areas b.i.d. PRN  Will continue IV antibiotics Admitted to the floor due suspected wound infection. Evaluated by wound care nurse specialist.   There is a healing Surgical Dehiscence to the R. Hip. Stage 1 Pressure Injury to the Coccyx; presenting as non-blanchable erythema and unstageable pressure Injury to the R. Gluteus with slough, pink tissue, and scant drainage.  Recommendations:   - Clean all wounds with normal saline and apply skin prep to the surrounding skin.    - Apply Hydrocolloid dressing (Comfeel) to the R. Heel and R. Gluteal wound bed and cover with a Foam dressing Q 72hrs PRN. Apply a Foam dressing to the R. Hip wound Q 72hrs PRN  - Apply Zinc Oxide Moisture Barrier Cream to the Perianal and Bilateral Gluteal areas b.i.d. PRN  Will continue IV antibiotics    **considering pt's very recent partial hip replacement 11/7/17 is near or about the involved decubiti ulcer with drainage-will f/u a  CT to r/o any underlying involvement

## 2018-01-08 NOTE — DISCHARGE NOTE ADULT - HOSPITAL COURSE
Background  and course of admission:    89 y/o male from [facility] with significant PMHx of HTN, CLL (WBC normally between 11-03150) BIB EMS from NH to the ED to r/o R hip/buttock wound.    Admitted to the floor due suspected wound infection. Managed with IV antibiotics and evaluated by wound care nurse specialist. There is a healing Surgical Dehiscence to the R. Hip (0.2cm x 0.2cm with red tissue, pale tissue, and scant drainage. There is a Stage 1 Pressure Injury to the Coccyx; presenting as non-blanchable erythema. There is an Unstageable Pressure Injury to the R. Gluteus (6cm x 4cm) and R. Heel (2cm x 2cm) with slough, pink tissue, and scant drainage. Recommendations were given for local wound care that include: Clean all wounds with normal saline and apply skin prep to the surrounding skin Apply Hydrocolloid dressing (Comfeel) to the R. Heel and R. Gluteal wound bed and cover with a Foam dressing Q 72hrs PRN Apply a Foam dressing to the R. Hip wound Q 72hrs PRN. Apply Zinc Oxide Moisture Barrier Cream to the Perianal and Bilateral Gluteal areas b.i.d. PRN. Elevate/float the patients heels using heel protectors and reposition the patient Q 2hrs using wedges or pillows.  Ambulation is adviced. Will complete a course of oral antibiotics Augmentin for a total of 10 days.    Given patient's improved clinical status and current hemodynamic stability, decision was made to discharge.  Please refer to patient's complete medical chart with documents for a full hospital course, for this is only a brief summary. Background  and course of admission:    89 y/o male from [facility] with significant PMHx of HTN, CLL (WBC normally between 11-31029) BIB EMS from NH to the ED to r/o R hip/buttock wound.    Admitted to the floor due suspected wound infection. Evaluated by wound care nurse specialist.  There is a healing Surgical Dehiscence to the R. Hip. Stage 1 Pressure Injury to the Coccyx; presenting as non-blanchable erythema and unstageable pressure Injury to the R. Gluteus with slough, pink tissue, and scant drainage. Recommendations include: clean all wounds with normal saline and apply skin prep to the surrounding skin.  Apply Hydrocolloid dressing (Comfeel) to the R. Heel and R. Gluteal wound bed and cover with a Foam dressing Q 72hrs PRN. Apply a Foam dressing to the R. Hip wound Q 72hrs PRN  Apply Zinc Oxide Moisture Barrier Cream to the Perianal and Bilateral Gluteal areas b.i.d. PRN  Elevate/float the patients heels using heel protectors and reposition the patient Q 2hrs using wedges or pillows. Ambulation is adviced. As per Infectious disease specialist. Will complete a course of oral antibiotics  for a total of 7 more days Doxycyline 100mg oral two times a day    Given patient's improved clinical status and current hemodynamic stability, decision was made to discharge.  Please refer to patient's complete medical chart with documents for a full hospital course, for this is only a brief summary.

## 2018-01-08 NOTE — PROGRESS NOTE ADULT - ASSESSMENT
89 y/o male from [facility] with significant PMHx of HTN, CLL (WBC normally between 11-50495) BIB EMS from NH to the ED to r/o R hip/buttock wound.    PENDING FAMILY TO CHOOSE NEW NURSING HOME

## 2018-01-08 NOTE — ADVANCED PRACTICE NURSE CONSULT - ASSESSMENT
This is a 89yr old male patient admitted for Cellulitis, presenting with the following:  -There is a healing Surgical Dehiscence to the R. Hip (0.2cm x 0.2cm with red tissue, pale tissue, and scant drainage  -There is a Stage 1 Pressure Injury to the Coccyx; presenting as non-blanchable erythema  -There is an Unstageable Pressure Injury to the R. Gluteus (6cm x 4cm) and R. Heel (2cm x 2cm) with slough, pink tissue, and scant drainage This is a 89yr old male patient admitted for Cellulitis, presenting with the following:  -There is a healing Surgical Dehiscence to the R. Hip (0.2cm x 0.2cm with red tissue, pale tissue, and scant drainage  -There is a Stage 1 Pressure Injury to the Coccyx; presenting as non-blanchable erythema  -There is an Unstageable Pressure Injury to the R. Gluteus (6cm x 4cm), and L. Scapula (1.4cm x 1.4cm), and R. Heel (2cm x 2cm) with slough, pink tissue, and scant drainage

## 2018-01-09 LAB
ANION GAP SERPL CALC-SCNC: 7 MMOL/L — SIGNIFICANT CHANGE UP (ref 5–17)
BASOPHILS # BLD AUTO: 0 K/UL — SIGNIFICANT CHANGE UP (ref 0–0.2)
BASOPHILS NFR BLD AUTO: 0.3 % — SIGNIFICANT CHANGE UP (ref 0–2)
BUN SERPL-MCNC: 11 MG/DL — SIGNIFICANT CHANGE UP (ref 7–18)
CALCIUM SERPL-MCNC: 8.3 MG/DL — LOW (ref 8.4–10.5)
CHLORIDE SERPL-SCNC: 104 MMOL/L — SIGNIFICANT CHANGE UP (ref 96–108)
CO2 SERPL-SCNC: 29 MMOL/L — SIGNIFICANT CHANGE UP (ref 22–31)
CREAT SERPL-MCNC: 0.64 MG/DL — SIGNIFICANT CHANGE UP (ref 0.5–1.3)
CRP SERPL-MCNC: 1.4 MG/DL — HIGH (ref 0–0.4)
EOSINOPHIL # BLD AUTO: 0.3 K/UL — SIGNIFICANT CHANGE UP (ref 0–0.5)
EOSINOPHIL NFR BLD AUTO: 2.4 % — SIGNIFICANT CHANGE UP (ref 0–6)
GLUCOSE SERPL-MCNC: 100 MG/DL — HIGH (ref 70–99)
HCT VFR BLD CALC: 33.2 % — LOW (ref 39–50)
HGB BLD-MCNC: 10.3 G/DL — LOW (ref 13–17)
LYMPHOCYTES # BLD AUTO: 37.1 % — SIGNIFICANT CHANGE UP (ref 13–44)
LYMPHOCYTES # BLD AUTO: 4.2 K/UL — HIGH (ref 1–3.3)
MCHC RBC-ENTMCNC: 30.3 PG — SIGNIFICANT CHANGE UP (ref 27–34)
MCHC RBC-ENTMCNC: 31 GM/DL — LOW (ref 32–36)
MCV RBC AUTO: 97.9 FL — SIGNIFICANT CHANGE UP (ref 80–100)
MONOCYTES # BLD AUTO: 0.2 K/UL — SIGNIFICANT CHANGE UP (ref 0–0.9)
MONOCYTES NFR BLD AUTO: 1.8 % — LOW (ref 2–14)
NEUTROPHILS # BLD AUTO: 6.6 K/UL — SIGNIFICANT CHANGE UP (ref 1.8–7.4)
NEUTROPHILS NFR BLD AUTO: 58.4 % — SIGNIFICANT CHANGE UP (ref 43–77)
PLATELET # BLD AUTO: 367 K/UL — SIGNIFICANT CHANGE UP (ref 150–400)
POTASSIUM SERPL-MCNC: 3.6 MMOL/L — SIGNIFICANT CHANGE UP (ref 3.5–5.3)
POTASSIUM SERPL-SCNC: 3.6 MMOL/L — SIGNIFICANT CHANGE UP (ref 3.5–5.3)
RBC # BLD: 3.39 M/UL — LOW (ref 4.2–5.8)
RBC # FLD: 15.4 % — HIGH (ref 10.3–14.5)
SODIUM SERPL-SCNC: 140 MMOL/L — SIGNIFICANT CHANGE UP (ref 135–145)
WBC # BLD: 11.3 K/UL — HIGH (ref 3.8–10.5)
WBC # FLD AUTO: 11.3 K/UL — HIGH (ref 3.8–10.5)

## 2018-01-09 PROCEDURE — 73701 CT LOWER EXTREMITY W/DYE: CPT | Mod: 26,RT

## 2018-01-09 RX ORDER — VANCOMYCIN HCL 1 G
1000 VIAL (EA) INTRAVENOUS ONCE
Qty: 0 | Refills: 0 | Status: COMPLETED | OUTPATIENT
Start: 2018-01-09 | End: 2018-01-09

## 2018-01-09 RX ORDER — VANCOMYCIN HCL 1 G
1000 VIAL (EA) INTRAVENOUS EVERY 24 HOURS
Qty: 0 | Refills: 0 | Status: DISCONTINUED | OUTPATIENT
Start: 2018-01-10 | End: 2018-01-11

## 2018-01-09 RX ADMIN — TAMSULOSIN HYDROCHLORIDE 0.4 MILLIGRAM(S): 0.4 CAPSULE ORAL at 22:05

## 2018-01-09 RX ADMIN — AMPICILLIN SODIUM AND SULBACTAM SODIUM 500 GRAM(S): 250; 125 INJECTION, POWDER, FOR SUSPENSION INTRAMUSCULAR; INTRAVENOUS at 18:23

## 2018-01-09 RX ADMIN — Medication 20 MILLIGRAM(S): at 06:50

## 2018-01-09 RX ADMIN — AMPICILLIN SODIUM AND SULBACTAM SODIUM 500 GRAM(S): 250; 125 INJECTION, POWDER, FOR SUSPENSION INTRAMUSCULAR; INTRAVENOUS at 06:50

## 2018-01-09 RX ADMIN — LISINOPRIL 10 MILLIGRAM(S): 2.5 TABLET ORAL at 06:49

## 2018-01-09 RX ADMIN — Medication 400 MILLIGRAM(S): at 00:41

## 2018-01-09 RX ADMIN — AMPICILLIN SODIUM AND SULBACTAM SODIUM 500 GRAM(S): 250; 125 INJECTION, POWDER, FOR SUSPENSION INTRAMUSCULAR; INTRAVENOUS at 14:00

## 2018-01-09 RX ADMIN — Medication 400 MILLIGRAM(S): at 06:50

## 2018-01-09 RX ADMIN — LIDOCAINE 1 PATCH: 4 CREAM TOPICAL at 00:15

## 2018-01-09 RX ADMIN — Medication 325 MILLIGRAM(S): at 16:47

## 2018-01-09 RX ADMIN — Medication 250 MILLIGRAM(S): at 08:00

## 2018-01-09 RX ADMIN — LIDOCAINE 1 PATCH: 4 CREAM TOPICAL at 14:00

## 2018-01-09 RX ADMIN — ENOXAPARIN SODIUM 40 MILLIGRAM(S): 100 INJECTION SUBCUTANEOUS at 14:00

## 2018-01-09 NOTE — PROGRESS NOTE ADULT - PROBLEM SELECTOR PLAN 1
Admitted to the floor due suspected wound infection. Evaluated by wound care nurse specialist.   There is a healing Surgical Dehiscence to the R. Hip. Stage 1 Pressure Injury to the Coccyx; presenting as non-blanchable erythema and unstageable pressure Injury to the R. Gluteus with slough, pink tissue, and scant drainage.  Recommendations:   - Clean all wounds with normal saline and apply skin prep to the surrounding skin.    - Apply Hydrocolloid dressing (Comfeel) to the R. Heel and R. Gluteal wound bed and cover with a Foam dressing Q 72hrs PRN. Apply a Foam dressing to the R. Hip wound Q 72hrs PRN  - Apply Zinc Oxide Moisture Barrier Cream to the Perianal and Bilateral Gluteal areas b.i.d. PRN  Will continue IV antibiotics    **considering pt's very recent partial hip replacement 11/7/17 is near or about the involved decubiti ulcer with drainage-will f/u a  CT to r/o any underlying involvement - CT done, Negative for this concern- can proceed with d/c planning.

## 2018-01-09 NOTE — PROGRESS NOTE ADULT - ASSESSMENT
89 y/o male from [facility] with significant PMHx of HTN, CLL (WBC normally between 11-09306) BIB EMS from NH to the ED to r/o R hip/buttock wound.    PENDING FAMILY TO CHOOSE NEW NURSING HOME

## 2018-01-09 NOTE — PROGRESS NOTE ADULT - ASSESSMENT
89 y/o male from [facility] with significant PMHx of HTN, CLL (WBC normally between [id number]) BIB EMS from NH to the ED to r/o R hip/buttock wound.    PENDING FAMILY TO CHOOSE NEW NURSING HOME

## 2018-01-10 LAB
ANION GAP SERPL CALC-SCNC: 8 MMOL/L — SIGNIFICANT CHANGE UP (ref 5–17)
BASOPHILS # BLD AUTO: 0.1 K/UL — SIGNIFICANT CHANGE UP (ref 0–0.2)
BASOPHILS NFR BLD AUTO: 0.8 % — SIGNIFICANT CHANGE UP (ref 0–2)
BUN SERPL-MCNC: 11 MG/DL — SIGNIFICANT CHANGE UP (ref 7–18)
CALCIUM SERPL-MCNC: 7.9 MG/DL — LOW (ref 8.4–10.5)
CHLORIDE SERPL-SCNC: 101 MMOL/L — SIGNIFICANT CHANGE UP (ref 96–108)
CO2 SERPL-SCNC: 28 MMOL/L — SIGNIFICANT CHANGE UP (ref 22–31)
CREAT SERPL-MCNC: 0.57 MG/DL — SIGNIFICANT CHANGE UP (ref 0.5–1.3)
EOSINOPHIL # BLD AUTO: 0.1 K/UL — SIGNIFICANT CHANGE UP (ref 0–0.5)
EOSINOPHIL NFR BLD AUTO: 1 % — SIGNIFICANT CHANGE UP (ref 0–6)
GLUCOSE SERPL-MCNC: 76 MG/DL — SIGNIFICANT CHANGE UP (ref 70–99)
HCT VFR BLD CALC: 29.3 % — LOW (ref 39–50)
HGB BLD-MCNC: 8.8 G/DL — LOW (ref 13–17)
LYMPHOCYTES # BLD AUTO: 3.7 K/UL — HIGH (ref 1–3.3)
LYMPHOCYTES # BLD AUTO: 52 % — HIGH (ref 13–44)
MCHC RBC-ENTMCNC: 28.7 PG — SIGNIFICANT CHANGE UP (ref 27–34)
MCHC RBC-ENTMCNC: 30.2 GM/DL — LOW (ref 32–36)
MCV RBC AUTO: 95.2 FL — SIGNIFICANT CHANGE UP (ref 80–100)
MONOCYTES # BLD AUTO: 0.5 K/UL — SIGNIFICANT CHANGE UP (ref 0–0.9)
MONOCYTES NFR BLD AUTO: 6.8 % — SIGNIFICANT CHANGE UP (ref 2–14)
NEUTROPHILS # BLD AUTO: 2.8 K/UL — SIGNIFICANT CHANGE UP (ref 1.8–7.4)
NEUTROPHILS NFR BLD AUTO: 39.4 % — LOW (ref 43–77)
PLATELET # BLD AUTO: 309 K/UL — SIGNIFICANT CHANGE UP (ref 150–400)
POTASSIUM SERPL-MCNC: 3.3 MMOL/L — LOW (ref 3.5–5.3)
POTASSIUM SERPL-SCNC: 3.3 MMOL/L — LOW (ref 3.5–5.3)
RBC # BLD: 3.08 M/UL — LOW (ref 4.2–5.8)
RBC # FLD: 14.7 % — HIGH (ref 10.3–14.5)
SODIUM SERPL-SCNC: 137 MMOL/L — SIGNIFICANT CHANGE UP (ref 135–145)
WBC # BLD: 7.1 K/UL — SIGNIFICANT CHANGE UP (ref 3.8–10.5)
WBC # FLD AUTO: 7.1 K/UL — SIGNIFICANT CHANGE UP (ref 3.8–10.5)

## 2018-01-10 RX ADMIN — AMPICILLIN SODIUM AND SULBACTAM SODIUM 500 GRAM(S): 250; 125 INJECTION, POWDER, FOR SUSPENSION INTRAMUSCULAR; INTRAVENOUS at 01:43

## 2018-01-10 RX ADMIN — Medication 325 MILLIGRAM(S): at 12:49

## 2018-01-10 RX ADMIN — LIDOCAINE 1 PATCH: 4 CREAM TOPICAL at 12:50

## 2018-01-10 RX ADMIN — AMPICILLIN SODIUM AND SULBACTAM SODIUM 500 GRAM(S): 250; 125 INJECTION, POWDER, FOR SUSPENSION INTRAMUSCULAR; INTRAVENOUS at 05:11

## 2018-01-10 RX ADMIN — ZINC SULFATE TAB 220 MG (50 MG ZINC EQUIVALENT) 220 MILLIGRAM(S): 220 (50 ZN) TAB at 12:49

## 2018-01-10 RX ADMIN — AMPICILLIN SODIUM AND SULBACTAM SODIUM 500 GRAM(S): 250; 125 INJECTION, POWDER, FOR SUSPENSION INTRAMUSCULAR; INTRAVENOUS at 12:50

## 2018-01-10 RX ADMIN — ENOXAPARIN SODIUM 40 MILLIGRAM(S): 100 INJECTION SUBCUTANEOUS at 12:49

## 2018-01-10 RX ADMIN — LIDOCAINE 1 PATCH: 4 CREAM TOPICAL at 02:15

## 2018-01-10 RX ADMIN — Medication 250 MILLIGRAM(S): at 08:57

## 2018-01-10 RX ADMIN — AMPICILLIN SODIUM AND SULBACTAM SODIUM 500 GRAM(S): 250; 125 INJECTION, POWDER, FOR SUSPENSION INTRAMUSCULAR; INTRAVENOUS at 17:38

## 2018-01-10 RX ADMIN — TAMSULOSIN HYDROCHLORIDE 0.4 MILLIGRAM(S): 0.4 CAPSULE ORAL at 22:08

## 2018-01-10 NOTE — DIETITIAN INITIAL EVALUATION ADULT. - OTHER INFO
Nutrition consult requested; from skilled nursing facility; multiple pressure ulcer unstagable x 3, stage I x 1; denied GI distress, chewing or swallowing problem at present, no specific food choices reported, 50 to 80% intake per observation and flow sheet, willing to try Ensure supplement

## 2018-01-10 NOTE — PROGRESS NOTE ADULT - PROBLEM SELECTOR PLAN 3
Improve score 2. Age and immobility.   Will give Lovenox for DVT PPx.

## 2018-01-10 NOTE — PROGRESS NOTE ADULT - PROBLEM SELECTOR PLAN 1
Admitted to the floor due suspected wound infection. Evaluated by wound care nurse specialist.   There is a healing Surgical Dehiscence to the R. Hip. Stage 1 Pressure Injury to the Coccyx; presenting as non-blanchable erythema  Recommendations:   - Clean all wounds with normal saline and apply skin prep to the surrounding skin.    - Apply Hydrocolloid dressing (Comfeel) to the R. Heel and R. Gluteal wound bed and cover with a Foam dressing Q 72hrs PRN. Apply a Foam dressing to the R. Hip wound Q 72hrs PRN  - Apply Zinc Oxide Moisture Barrier Cream to the Perianal and Bilateral Gluteal areas b.i.d. PRN  Will continue IV antibiotics  **Considering pt's very recent partial hip replacement 11/7/17 is near or about the involved decubiti ulcer with drainage-will f/u a  CT to r/o any underlying involvement - CT done, Negative for this concern- can proceed with d/c planning. Admitted to the floor due suspected wound infection. Evaluated by wound care nurse specialist.   There is a healing Surgical Dehiscence to the R. Hip. Stage 1 Pressure Injury to the Coccyx; presenting as non-blanchable erythema  Recommendations:   - Clean all wounds with normal saline and apply skin prep to the surrounding skin.    - Apply Hydrocolloid dressing (Comfeel) to the R. Heel and R. Gluteal wound bed and cover with a Foam dressing Q 72hrs PRN. Apply a Foam dressing to the R. Hip wound Q 72hrs PRN  - Apply Zinc Oxide Moisture Barrier Cream to the Perianal and Bilateral Gluteal areas b.i.d. PRN  Will continue IV antibiotics  **Considering pt's very recent partial hip replacement 11/7/17 is near or about the involved decubiti ulcer with drainage-will f/u a  CT to r/o any underlying involvement - CT done, Negative for this concern- can proceed with d/c planning. Awaiting D/C planning.

## 2018-01-10 NOTE — PROGRESS NOTE ADULT - SUBJECTIVE AND OBJECTIVE BOX
==================PGY 1 Note===================   Discussed with supervising resident and primary attending    ================CHIEF COMPLAINT===============  Patient is a 89y old  Male who presents with a chief complaint of right hip ulcer (08 Jan 2018 12:16)        =========INTERVAL HPI/OVERNIGHT EVENTS=========  Offers no new complaints; current symptoms resolving      ============CURRENT MEDICATIONS===============    MEDICATIONS  (STANDING):  ampicillin/sulbactam  IVPB 3 Gram(s) IV Intermittent every 6 hours  enoxaparin Injectable 40 milliGRAM(s) SubCutaneous daily  ferrous    sulfate 325 milliGRAM(s) Oral daily  furosemide    Tablet 20 milliGRAM(s) Oral daily  lidocaine   Patch 1 Patch Transdermal daily  lisinopril 10 milliGRAM(s) Oral daily  tamsulosin 0.4 milliGRAM(s) Oral at bedtime  zinc sulfate 220 milliGRAM(s) Oral <User Schedule>    MEDICATIONS  (PRN):        ============REVIEW OF SYSTEMS==================    CONSTITUTIONAL: No fever  EYES: no acute visual disturbances  NECK: No pain or stiffness  RESPIRATORY: No cough; No shortness of breath  CARDIOVASCULAR: No chest pain, no palpitations  GASTROINTESTINAL: No pain. No nausea or vomiting; No diarrhea   NEUROLOGICAL: No headache or numbness, no tremors  MUSCULOSKELETAL: No joint pain, no muscle pain  GENITOURINARY: no dysuria, no frequency, no hesitancy  PSYCHIATRY: no depression , no anxiety  ALL OTHER  ROS negative      ================VITALS SIGNS=====================  Vital Signs Last 24 Hrs  T(C): 36.3 (08 Jan 2018 14:10), Max: 36.7 (07 Jan 2018 15:39)  T(F): 97.3 (08 Jan 2018 14:10), Max: 98.1 (07 Jan 2018 22:47)  HR: 77 (08 Jan 2018 14:10) (66 - 77)  BP: 111/46 (08 Jan 2018 14:10) (99/62 - 158/53)  BP(mean): --  RR: 18 (08 Jan 2018 14:10) (17 - 18)  SpO2: 95% (08 Jan 2018 14:10) (95% - 99%)    ===============PHYSICAL EXAM====================    GENERAL: NAD  HEENT: Normocephalic;  conjunctivae and sclerae clear; moist mucous membranes;   NECK : supple  CHEST/LUNG: Clear to auscultation bilaterally with good air entry   HEART: S1 S2  regular; no murmurs, gallops or rubs  ABDOMEN: Soft, Nontender, Nondistended; Bowel sounds present  EXTREMITIES: Healing Surgical Dehiscence to the R. Hip (0.2cm x 0.2cm with red tissue, pale tissue, and scant drainage.   Stage 1 Pressure Injury to the Coccyx; presenting as non-blanchable erythema  Unstageable Pressure Injury to the R. Gluteus (6cm x 4cm) and R. Heel (2cm x 2cm) with slough, pink tissue, and scant drainage	N  NERVOUS SYSTEM:  Awake and alert; Oriented  to place, person and time ; no new deficits    ==============LABORATORIES======================  LABS:                        9.7    11.5  )-----------( 347      ( 08 Jan 2018 07:33 )             30.2     01-08    139  |  102  |  12  ----------------------------<  86  3.5   |  30  |  0.64    Ca    8.6      08 Jan 2018 07:33  Phos  3.6     01-08  Mg     2.2     01-08    TPro  6.6  /  Alb  3.3<L>  /  TBili  0.2  /  DBili  x   /  AST  19  /  ALT  13  /  AlkPhos  103  01-07    PT/INR - ( 07 Jan 2018 01:27 )   PT: 10.9 sec;   INR: 1.00 ratio         PTT - ( 07 Jan 2018 01:27 )  PTT:27.7 sec    CAPILLARY BLOOD GLUCOSE          =============INPUTS/OUPUTS=====================    01-08-18 @ 07:01  -  01-08-18 @ 14:53  --------------------------------------------------------  IN: 0 mL / OUT: 2 mL / NET: -2 mL          RADIOLOGY & ADDITIONAL TESTS:    Imaging Personally Reviewed:  YES    Consultant(s) Notes Reviewed:   YES    Care Discussed with Consultants : YES    Plan of care was discussed with patient  and /or primary care giver; all questions and concerns were addressed and care was aligned with patient's wishes. Time was allowed for questions that were answered to the best of my abilities
==================PGY 1 Note===================   Discussed with supervising resident and primary attending    ================CHIEF COMPLAINT===============  Patient is a 89y old  Male who presents with a chief complaint of right hip ulcer (08 Jan 2018 12:16)        =========INTERVAL HPI/OVERNIGHT EVENTS=========  Offers no new complaints; current symptoms resolving      ============CURRENT MEDICATIONS===============    MEDICATIONS  (STANDING):  ampicillin/sulbactam  IVPB 3 Gram(s) IV Intermittent every 6 hours  enoxaparin Injectable 40 milliGRAM(s) SubCutaneous daily  ferrous    sulfate 325 milliGRAM(s) Oral daily  furosemide    Tablet 20 milliGRAM(s) Oral daily  lidocaine   Patch 1 Patch Transdermal daily  lisinopril 10 milliGRAM(s) Oral daily  tamsulosin 0.4 milliGRAM(s) Oral at bedtime  zinc sulfate 220 milliGRAM(s) Oral <User Schedule>    MEDICATIONS  (PRN):  ibuprofen  Tablet 400 milliGRAM(s) Oral every 4 hours PRN Pain 5-10/10        ============REVIEW OF SYSTEMS==================    CONSTITUTIONAL: No fever  EYES: no acute visual disturbances  NECK: No pain or stiffness  RESPIRATORY: No cough; No shortness of breath  CARDIOVASCULAR: No chest pain, no palpitations  GASTROINTESTINAL: No pain. No nausea or vomiting; No diarrhea   NEUROLOGICAL: No headache or numbness, no tremors  MUSCULOSKELETAL: No joint pain, no muscle pain  GENITOURINARY: no dysuria, no frequency, no hesitancy  PSYCHIATRY: no depression , no anxiety  ALL OTHER  ROS negative      ================VITALS SIGNS=====================  Vital Signs Last 24 Hrs  T(C): 37.1 (09 Jan 2018 14:18), Max: 37.1 (09 Jan 2018 14:18)  T(F): 98.8 (09 Jan 2018 14:18), Max: 98.8 (09 Jan 2018 14:18)  HR: 79 (09 Jan 2018 14:18) (70 - 81)  BP: 157/41 (09 Jan 2018 14:18) (112/52 - 157/41)  BP(mean): --  RR: 16 (09 Jan 2018 14:18) (16 - 18)  SpO2: 97% (09 Jan 2018 14:18) (97% - 98%)    ===============PHYSICAL EXAM====================    GENERAL: NAD  HEENT: Normocephalic;  conjunctivae and sclerae clear; moist mucous membranes;   NECK : supple  CHEST/LUNG: Clear to auscultation bilaterally with good air entry   HEART: S1 S2  regular; no murmurs, gallops or rubs  ABDOMEN: Soft, Nontender, Nondistended; Bowel sounds present  EXTREMITIES: Healing Surgical Dehiscence to the R. Hip (0.2cm x 0.2cm with red tissue, pale tissue, and scant drainage.   Stage 1 Pressure Injury to the Coccyx; presenting as non-blanchable erythema  Unstageable Pressure Injury to the R. Gluteus (6cm x 4cm) and R. Heel (2cm x 2cm) with slough, pink tissue, and scant drainage  NERVOUS SYSTEM:  Awake and alert; Oriented  to place, person and time ; no new deficits    ==============LABORATORIES======================  LABS:                        10.3   11.3  )-----------( 367      ( 09 Jan 2018 07:34 )             33.2     01-09    140  |  104  |  11  ----------------------------<  100<H>  3.6   |  29  |  0.64    Ca    8.3<L>      09 Jan 2018 07:34  Phos  3.6     01-08  Mg     2.2     01-08          CAPILLARY BLOOD GLUCOSE          =============INPUTS/OUPUTS=====================    01-08-18 @ 07:01  -  01-09-18 @ 07:00  --------------------------------------------------------  IN: 0 mL / OUT: 2 mL / NET: -2 mL          RADIOLOGY & ADDITIONAL TESTS:    Imaging Personally Reviewed:  YES    Consultant(s) Notes Reviewed:   YES    Care Discussed with Consultants : YES    Plan of care was discussed with patient  and /or primary care giver; all questions and concerns were addressed and care was aligned with patient's wishes. Time was allowed for questions that were answered to the best of my abilities
Subjective: awake alert , c/o pain over right hip . no fevers . ct right hip with cellulitis, no underlying loosening or osteo . no fevers       PHYSICAL EXAM:    Vital Signs Last 24 Hrs  T(C): 36.7 (10 Carl 2018 14:42), Max: 37 (09 Jan 2018 20:24)  T(F): 98.1 (10 Carl 2018 14:42), Max: 98.6 (09 Jan 2018 20:24)  HR: 57 (10 Carl 2018 15:04) (57 - 72)  BP: 112/40 (10 Carl 2018 15:04) (111/44 - 128/38)  BP(mean): --  RR: 16 (10 Carl 2018 14:42) (16 - 17)  SpO2: 97% (10 Carl 2018 15:04) (96% - 98%)    Constitutional: awake alert oriented times 2-3    PRAVEEN SCLERA anicteric EOMI   LUNGS clear   CVS s1 s2 aud no murmurs   ABDOMEN soft non tender no HSM   NEUROLOGY  no defecits   SKIN right post thoracic area with stage 2-3 clean du /sacral du c;lean /left heel with stage 3 du clean   EXTREMITIES no edema no cyanosis /right hip wound with pin pt opening with min serous d/c         LABS/DIAGNOSTIC TESTS                        8.8    7.1   )-----------( 309      ( 10 Carl 2018 08:28 )             29.3     01-10    137  |  101  |  11  ----------------------------<  76  3.3<L>   |  28  |  0.57    Ca    7.9<L>      10 Carl 2018 08:28            meds   ampicillin/sulbactam  IVPB 3 Gram(s) IV Intermittent every 6 hours  enoxaparin Injectable 40 milliGRAM(s) SubCutaneous daily  ferrous    sulfate 325 milliGRAM(s) Oral daily  furosemide    Tablet 20 milliGRAM(s) Oral daily  ibuprofen  Tablet 400 milliGRAM(s) Oral every 4 hours PRN  lidocaine   Patch 1 Patch Transdermal daily  lisinopril 10 milliGRAM(s) Oral daily  tamsulosin 0.4 milliGRAM(s) Oral at bedtime  vancomycin  IVPB 1000 milliGRAM(s) IV Intermittent every 24 hours  zinc sulfate 220 milliGRAM(s) Oral <User Schedule>        CULTURES  Culture Results: blood  No growth to date. (01-07 @ 12:07)        RADIOLOGY  < from: CT Hip w/ IV Cont, Right (01.09.18 @ 13:20) >    EXAM:  CT HIP ONLY IC RT                            PROCEDURE DATE:  01/09/2018          INTERPRETATION:  HISTORY: Status post right hip arthroplasty. History of   fall. Pressure ulcer along the right hip. Concern for underlying   collection.    Helical CT imaging of the right hip was performed after the intravenous   demonstration of contrast. 96 cc of Omnipaque 350 was administered   intravenously. 4 cc was discarded. Sagittal and coronal reformats were   provided. 3-D reformats were performed on a separate workstation.    Correlation is made with prior radiographs from January 7, 2018.     Findings:    Patient is status post right hip hemiarthroplasty with a noncemented   femoral component. The hardware is intact without evidence of osteolysis   or loosening. There is no dislocation. Heterotopic bone formation and   fragmented osteophytes are noted along the superior rim of the   acetabulum. There is no evidence of acute fracture within the imaged   portion of the right hemipelvis.    There is linear scarring within the right posterior lateral subcutaneous   fat at the level of the hip consistent with prior surgical tract. There   is edema within the subcutaneous fat laterally with overlying skin   thickening suggestive of cellulitis. There is small fluid within the   greater trochanteric bursa which is nonspecific. There is rounded soft   tissue attenuation within the gluteal subcutaneous fat just inferior and   medial to the right ischial tuberosity with vague enhancement. This   contacts the cutaneous surface in the gluteal region and extends towards   the ischial tuberosity. This is consistent with a developing decubitus   ulcer.    There is no disproportionate fatty atrophy of the musculature.    Impression:    Status post right hip hemiarthroplasty without evidence of osteolysis or   loosening. No prosthetic or periprosthetic fracture. Nonspecific fluid   within the right greater trochanteric bursa.    Rounded soft tissue attenuation within the right gluteal subcutaneous fat   just inferior and medial to the right ischial tuberosity with vague   enhancement. This contacts the cutaneous surface in the gluteal region   and extends towards the ischial tuberosity. This is consistent with a   developing decubitusulcer.                ZULY CROOKS M.D., ATTENDING RADIOLOGIST    < end of copied text >
==================PGY 1 Note===================   Discussed with supervising resident and primary attending    ================CHIEF COMPLAINT===============  Patient is a 89y old  Male who presents with a chief complaint of right hip ulcer (08 Jan 2018 12:16)        =========INTERVAL HPI/OVERNIGHT EVENTS=========  Offers no new complaints; current symptoms resolving      ============CURRENT MEDICATIONS===============    MEDICATIONS  (STANDING):  ampicillin/sulbactam  IVPB 3 Gram(s) IV Intermittent every 6 hours  enoxaparin Injectable 40 milliGRAM(s) SubCutaneous daily  ferrous    sulfate 325 milliGRAM(s) Oral daily  furosemide    Tablet 20 milliGRAM(s) Oral daily  lidocaine   Patch 1 Patch Transdermal daily  lisinopril 10 milliGRAM(s) Oral daily  tamsulosin 0.4 milliGRAM(s) Oral at bedtime  vancomycin  IVPB 1000 milliGRAM(s) IV Intermittent every 24 hours  zinc sulfate 220 milliGRAM(s) Oral <User Schedule>    MEDICATIONS  (PRN):  ibuprofen  Tablet 400 milliGRAM(s) Oral every 4 hours PRN Pain 5-10/10        ============REVIEW OF SYSTEMS==================    CONSTITUTIONAL: No fever  EYES: no acute visual disturbances  NECK: No pain or stiffness  RESPIRATORY: No cough; No shortness of breath  CARDIOVASCULAR: No chest pain, no palpitations  GASTROINTESTINAL: No pain. No nausea or vomiting; No diarrhea   NEUROLOGICAL: No headache or numbness, no tremors  MUSCULOSKELETAL: No joint pain, no muscle pain  GENITOURINARY: no dysuria, no frequency, no hesitancy  PSYCHIATRY: no depression , no anxiety  ALL OTHER  ROS negative      ================VITALS SIGNS=====================  Vital Signs Last 24 Hrs  T(C): 36.8 (10 Carl 2018 04:54), Max: 37.1 (09 Jan 2018 14:18)  T(F): 98.3 (10 Carl 2018 04:54), Max: 98.8 (09 Jan 2018 14:18)  HR: 71 (10 Carl 2018 04:54) (71 - 79)  BP: 111/44 (10 Carl 2018 04:54) (111/44 - 157/41)  BP(mean): --  RR: 17 (10 Carl 2018 04:54) (16 - 17)  SpO2: 98% (10 Carl 2018 04:54) (97% - 98%)    ===============PHYSICAL EXAM====================    GENERAL: NAD  HEENT: Normocephalic;  conjunctivae and sclerae clear; moist mucous membranes;   NECK : supple  CHEST/LUNG: Clear to auscultation bilaterally with good air entry   HEART: S1 S2  regular; no murmurs, gallops or rubs  ABDOMEN: Soft, Nontender, Nondistended; Bowel sounds present  EXTREMITIES: Healing Surgical Dehiscence to the R. Hip (0.2cm x 0.2cm with red tissue, pale tissue, and scant drainage.   Stage 1 Pressure Injury to the Coccyx; presenting as non-blanchable erythema  NERVOUS SYSTEM:  Awake and alert; Oriented  to place, person and time ; no new deficits    ==============LABORATORIES======================  LABS:                        8.8    7.1   )-----------( 309      ( 10 Carl 2018 08:28 )             29.3     01-10    137  |  101  |  11  ----------------------------<  76  3.3<L>   |  28  |  0.57    Ca    7.9<L>      10 Carl 2018 08:28          CAPILLARY BLOOD GLUCOSE          =============INPUTS/OUPUTS=====================        RADIOLOGY & ADDITIONAL TESTS:    Imaging Personally Reviewed:  YES    Consultant(s) Notes Reviewed:   YES    Care Discussed with Consultants : YES    Plan of care was discussed with patient  and /or primary care giver; all questions and concerns were addressed and care was aligned with patient's wishes. Time was allowed for questions that were answered to the best of my abilities
JARAD HENAO  MR# [id number]  89yMale        Patient is a 89y old  Male who presents with a chief complaint of right hip ulcer (08 Jan 2018 12:16)      INTERVAL HPI/OVERNIGHT EVENTS:  Patient seen and examined at bedside. No notiations of chest pain, palpitation, SOB, nausea, vomiting, abdominal pain.    ALLERGIES  No Known Allergies      MEDICATIONS  ampicillin/sulbactam  IVPB 3 Gram(s) IV Intermittent every 6 hours  enoxaparin Injectable 40 milliGRAM(s) SubCutaneous daily  ferrous    sulfate 325 milliGRAM(s) Oral daily  furosemide    Tablet 20 milliGRAM(s) Oral daily  ibuprofen  Tablet 400 milliGRAM(s) Oral every 4 hours PRN Pain 5-10/10  lidocaine   Patch 1 Patch Transdermal daily  lisinopril 10 milliGRAM(s) Oral daily  tamsulosin 0.4 milliGRAM(s) Oral at bedtime  zinc sulfate 220 milliGRAM(s) Oral <User Schedule>              REVIEW OF SYSTEMS:  CONSTITUTIONAL: No fever, weight loss, or fatigue  EYES: No eye pain, visual disturbances, or discharge  ENT:  No difficulty hearing, tinnitus, vertigo; No sinus or throat pain  NECK: No pain or stiffness  RESPIRATORY: No cough, wheezing, chills or hemoptysis; No Shortness of Breath  CARDIOVASCULAR: No chest pain, palpitations, passing out, dizziness, or leg swelling  GASTROINTESTINAL: No abdominal or epigastric pain. No nausea, vomiting, or hematemesis; No diarrhea or constipation. No melena or hematochezia.  GENITOURINARY: No dysuria, frequency, hematuria, or incontinence  NEUROLOGICAL: No headaches, memory loss, loss of strength, numbness, or tremors  SKIN: No itching, burning, rashes, or lesions   LYMPH Nodes: No enlarged glands  ENDOCRINE: No heat or cold intolerance; No hair loss  MUSCULOSKELETAL: No joint pain or swelling; No muscle, back, or extremity pain  PSYCHIATRIC: No depression, anxiety, mood swings, or difficulty sleeping  HEME/LYMPH: No easy bruising, or bleeding gums  ALLERGY AND IMMUNOLOGIC: No hives or eczema	    [ ] All others negative	  [ ] Unable to obtain      T(C): 37.1 (01-09-18 @ 14:18), Max: 37.1 (01-09-18 @ 14:18)  T(F): 98.8 (01-09-18 @ 14:18), Max: 98.8 (01-09-18 @ 14:18)  HR: 79 (01-09-18 @ 14:18) (70 - 81)  BP: 157/41 (01-09-18 @ 14:18) (112/52 - 157/41)  RR: 16 (01-09-18 @ 14:18) (16 - 18)  SpO2: 97% (01-09-18 @ 14:18) (97% - 98%)  Wt(kg): --    I&O's Summary    08 Jan 2018 07:01  -  09 Jan 2018 07:00  --------------------------------------------------------  IN: 0 mL / OUT: 2 mL / NET: -2 mL          PHYSICAL EXAM:  A X O x  HEAD:  Atraumatic, Normocephalic  EYES: EOMI, PERRLA, conjunctiva and sclera clear  NECK: Supple, No JVD, Normal thyroid  Resp: CTAB, No crackles, wheezing,   CVS: Regular rate and rhythm; No discernable murmurs, rubs, or gallops  ABD: Soft, Nontender, Nondistended; Bowel sounds present  EXTREMITIES:  2+ Peripheral Pulses, No edema  LYMPH: No dicernable lymphadenopathy noted  GENERAL: NAD, well-groomed, well-developed      LABS:                        10.3   11.3  )-----------( 367      ( 09 Jan 2018 07:34 )             33.2     01-09    140  |  104  |  11  ----------------------------<  100<H>  3.6   |  29  |  0.64    Ca    8.3<L>      09 Jan 2018 07:34  Phos  3.6     01-08  Mg     2.2     01-08          CAPILLARY BLOOD GLUCOSE          Troponins:  ProBNP:  Lipid Profile:   HgA1c:  TSH:           RADIOLOGY & ADDITIONAL TESTS:    Imaging Personally Reviewed:  [ ] YES  [ ] NO      Consultant(s) Notes Reviewed:  [x ] YES  [ ] NO    Care Discussed with Consultants/Other Providers [ x] YES  [ ] NO          PAST MEDICAL & SURGICAL HISTORY:  History of hypertension  No significant past surgical history        Cellulitis  No h/o HF  Unknown h/o HF  Yes  Handoff  MEWS Score  History of hypertension  Decubitus skin ulcer  Cellulitis  Need for prophylactic measure  History of hypertension  Decubitus skin ulcer  No significant past surgical history  No significant past surgical history  A-R/O SEPSIS  History of hypertension

## 2018-01-10 NOTE — DIETITIAN INITIAL EVALUATION ADULT. - NUTRITION INTERVENTION
Medical Food Supplements/Feeding Assistance/Collaboration and Referral of Nutrition Care/Discharge and Transfer of Nutrition Care to New Setting/Vitamin/Meals and Snack/Mineral

## 2018-01-10 NOTE — PROGRESS NOTE ADULT - PROBLEM SELECTOR PLAN 2
C/W with Home medications

## 2018-01-10 NOTE — DIETITIAN INITIAL EVALUATION ADULT. - MD RECOMMEND
Ensure Enlive  1can ( 240ml ) x bid ( 700 kcal, 40 g protein); MVI/minerals, Vit C supplement as medically feasible

## 2018-01-10 NOTE — PROGRESS NOTE ADULT - ASSESSMENT
89 y/o male from [facility] with significant PMHx of HTN, CLL (WBC normally between [id number]) [facility] to r/o R hip/buttock wound x today. Patient noticed serous drainage from his right hip wound today during dressing and daughter was notified and patient was brought in to ED. He also admit having another pressure ulcer at the middle of the back.   Daughter stated that on 11/7/2017 pt had partial hip replacement at [facility], and had a complication with PNA and episode of sepsis 5 days s/p surgery. Pt was admitted at [facility]U was put on chest tube for secretions due to PNA, and had fluid drained for 5 days. On 11/28/17 s/p removal of chest tube, pt was in [facility] for on physical therapy for R knee which as been chronically pain for many years from arthritis. He is improving and using rollator at rehab.    Pt denies fever, chills, CP, SOB, or any other complaints. Orthopedist: Dr. Brown (07 Jan 2018 11:02). ID called for eval of infected  right hip wound. denies any pain over right hip wound , no fevers no chills . afebrile on adm with leucocytosis , xray of right hip with no dislocation or loosening .     # early right hip dehiscence with min serous drainage for now . xray with no loosening of prosthesis . doubt underlying infected prosthesis . ct hip with no loosening or OM . may have cellulitis with bursitis . low esr     plan  blood cx times 2 f/u ( neg )   can change to oral doxy 100mg po bid for 7 days more   ortho to see pt before d/c          stable for d/c from id pt of view   d/w resident 89 y/o male from [facility] with significant PMHx of HTN, CLL (WBC normally between [id number]) [facility] from [facility] to [facility] ED to r/o R hip/buttock wound x today. Patient noticed serous drainage from his right hip wound today during dressing and daughter was notified and patient was brought in to ED. He also admit having another pressure ulcer at the middle of the back.   Daughter stated that on 11/7/2017 pt had partial hip replacement at [facility], and had a complication with PNA and episode of sepsis 5 days s/p surgery. Pt was admitted at CCU was put on chest tube for secretions due to PNA, and had fluid drained for 5 days. On 11/28/17 s/p removal of chest tube, pt was in [facility] for on physical therapy for R knee which as been chronically pain for many years from arthritis. He is improving and using rollator at rehab.    Pt denies fever, chills, CP, SOB, or any other complaints. Orthopedist: Dr. Brown (07 Jan 2018 11:02). ID called for eval of infected  right hip wound. denies any pain over right hip wound , no fevers no chills . afebrile on adm with leucocytosis , xray of right hip with no dislocation or loosening .     # early right hip dehiscence with min serous drainage for now . xray with no loosening of prosthesis . doubt underlying infected prosthesis . ct hip with no loosening or OM . may have cellulitis with bursitis . low esr     plan  blood cx times 2 f/u ( neg )   can change to oral doxy 100mg po bid for 7 days more   ortho to see pt before d/c          stable for d/c from id pt of view   d/w resident and pmd

## 2018-01-10 NOTE — PROGRESS NOTE ADULT - ASSESSMENT
89 y/o male from [facility] with significant PMHx of HTN, CLL (WBC normally between [phone]) B[facility] from [facility] to [facility] ED to r/o R hip/buttock wound.    PENDING FAMILY TO CHOOSE NEW NURSING HOME

## 2018-01-11 VITALS
SYSTOLIC BLOOD PRESSURE: 90 MMHG | OXYGEN SATURATION: 98 % | TEMPERATURE: 98 F | DIASTOLIC BLOOD PRESSURE: 66 MMHG | RESPIRATION RATE: 19 BRPM | HEART RATE: 70 BPM

## 2018-01-11 PROCEDURE — 86140 C-REACTIVE PROTEIN: CPT

## 2018-01-11 PROCEDURE — 83036 HEMOGLOBIN GLYCOSYLATED A1C: CPT

## 2018-01-11 PROCEDURE — 80048 BASIC METABOLIC PNL TOTAL CA: CPT

## 2018-01-11 PROCEDURE — 84100 ASSAY OF PHOSPHORUS: CPT

## 2018-01-11 PROCEDURE — 83735 ASSAY OF MAGNESIUM: CPT

## 2018-01-11 PROCEDURE — 99285 EMERGENCY DEPT VISIT HI MDM: CPT | Mod: 25

## 2018-01-11 PROCEDURE — 97162 PT EVAL MOD COMPLEX 30 MIN: CPT

## 2018-01-11 PROCEDURE — 97110 THERAPEUTIC EXERCISES: CPT

## 2018-01-11 PROCEDURE — 85610 PROTHROMBIN TIME: CPT

## 2018-01-11 PROCEDURE — 93005 ELECTROCARDIOGRAM TRACING: CPT

## 2018-01-11 PROCEDURE — 87040 BLOOD CULTURE FOR BACTERIA: CPT

## 2018-01-11 PROCEDURE — 97530 THERAPEUTIC ACTIVITIES: CPT

## 2018-01-11 PROCEDURE — 84443 ASSAY THYROID STIM HORMONE: CPT

## 2018-01-11 PROCEDURE — 97116 GAIT TRAINING THERAPY: CPT

## 2018-01-11 PROCEDURE — 85652 RBC SED RATE AUTOMATED: CPT

## 2018-01-11 PROCEDURE — 73701 CT LOWER EXTREMITY W/DYE: CPT

## 2018-01-11 PROCEDURE — 85730 THROMBOPLASTIN TIME PARTIAL: CPT

## 2018-01-11 PROCEDURE — 83605 ASSAY OF LACTIC ACID: CPT

## 2018-01-11 PROCEDURE — 73502 X-RAY EXAM HIP UNI 2-3 VIEWS: CPT

## 2018-01-11 PROCEDURE — 80061 LIPID PANEL: CPT

## 2018-01-11 PROCEDURE — 80053 COMPREHEN METABOLIC PANEL: CPT

## 2018-01-11 PROCEDURE — 85027 COMPLETE CBC AUTOMATED: CPT

## 2018-01-11 RX ORDER — ASCORBIC ACID 60 MG
500 TABLET,CHEWABLE ORAL DAILY
Qty: 0 | Refills: 0 | Status: DISCONTINUED | OUTPATIENT
Start: 2018-01-11 | End: 2018-01-11

## 2018-01-11 RX ORDER — IBUPROFEN 200 MG
1 TABLET ORAL
Qty: 180 | Refills: 0
Start: 2018-01-11 | End: 2018-02-09

## 2018-01-11 RX ORDER — ZINC SULFATE TAB 220 MG (50 MG ZINC EQUIVALENT) 220 (50 ZN) MG
1 TAB ORAL
Qty: 0 | Refills: 0 | DISCHARGE
Start: 2018-01-11

## 2018-01-11 RX ORDER — ASCORBIC ACID 60 MG
1 TABLET,CHEWABLE ORAL
Qty: 0 | Refills: 0 | DISCHARGE
Start: 2018-01-11

## 2018-01-11 RX ADMIN — LISINOPRIL 10 MILLIGRAM(S): 2.5 TABLET ORAL at 06:25

## 2018-01-11 RX ADMIN — Medication 500 MILLIGRAM(S): at 11:56

## 2018-01-11 RX ADMIN — LIDOCAINE 1 PATCH: 4 CREAM TOPICAL at 00:41

## 2018-01-11 RX ADMIN — ENOXAPARIN SODIUM 40 MILLIGRAM(S): 100 INJECTION SUBCUTANEOUS at 11:56

## 2018-01-11 RX ADMIN — AMPICILLIN SODIUM AND SULBACTAM SODIUM 500 GRAM(S): 250; 125 INJECTION, POWDER, FOR SUSPENSION INTRAMUSCULAR; INTRAVENOUS at 00:46

## 2018-01-11 RX ADMIN — Medication 20 MILLIGRAM(S): at 06:25

## 2018-01-11 RX ADMIN — Medication 325 MILLIGRAM(S): at 11:56

## 2018-01-11 RX ADMIN — LIDOCAINE 1 PATCH: 4 CREAM TOPICAL at 11:56

## 2018-01-11 RX ADMIN — Medication 250 MILLIGRAM(S): at 08:56

## 2018-01-11 RX ADMIN — AMPICILLIN SODIUM AND SULBACTAM SODIUM 500 GRAM(S): 250; 125 INJECTION, POWDER, FOR SUSPENSION INTRAMUSCULAR; INTRAVENOUS at 11:56

## 2018-01-11 RX ADMIN — Medication 1 TABLET(S): at 11:56

## 2018-01-11 RX ADMIN — Medication 400 MILLIGRAM(S): at 00:47

## 2018-01-11 RX ADMIN — Medication 400 MILLIGRAM(S): at 02:11

## 2018-01-11 RX ADMIN — AMPICILLIN SODIUM AND SULBACTAM SODIUM 500 GRAM(S): 250; 125 INJECTION, POWDER, FOR SUSPENSION INTRAMUSCULAR; INTRAVENOUS at 06:25

## 2018-01-11 RX ADMIN — AMPICILLIN SODIUM AND SULBACTAM SODIUM 500 GRAM(S): 250; 125 INJECTION, POWDER, FOR SUSPENSION INTRAMUSCULAR; INTRAVENOUS at 17:04

## 2018-01-12 LAB
CULTURE RESULTS: SIGNIFICANT CHANGE UP
SPECIMEN SOURCE: SIGNIFICANT CHANGE UP

## 2018-04-02 NOTE — ED ADULT NURSE NOTE - CCCP TRG CHIEF CMPLNT
Detail Level: Simple see chief complaint quote/sent from [facility] r/o sepsis/right hip abscess Detail Level: Generalized Include Location In Plan?: No

## 2018-07-11 NOTE — ADVANCED PRACTICE NURSE CONSULT - RECOMMEDATIONS
I have called and explained to the patient about how to take the medication. He is to make a follow up apt with in a month and get labs done before his visit.   -Clean all wounds with normal saline and apply skin prep to the surrounding skin  -Apply Hydrocolloid dressing (Comfeel) to the R. Heel and R. Gluteal wound bed and cover with a Foam dressing Q 72hrs PRN  -Apply a Foam dressing to the R. Hip wound Q 72hrs PRN  -Apply Zinc Oxide Moisture Barrier Cream to the Perianal and Bilateral Gluteal areas b.i.d. PRN  -Elevate/float the patients heels using heel protectors and reposition the patient Q 2hrs using wedges or pillows -Clean all wounds with normal saline and apply skin prep to the surrounding skin  -Apply Hydrocolloid dressing (Comfeel) to the R. Heel, L. Scapula, and R. Gluteal wound bed and cover with a Foam dressing Q 72hrs PRN  -Apply a Foam dressing to the R. Hip wound Q 72hrs PRN  -Apply Zinc Oxide Moisture Barrier Cream to the Perianal and Bilateral Gluteal areas b.i.d. PRN  -Elevate/float the patients heels using heel protectors and reposition the patient Q 2hrs using wedges or pillows

## 2020-01-01 ENCOUNTER — INPATIENT (INPATIENT)
Facility: HOSPITAL | Age: 85
LOS: 5 days | Discharge: NURSING, REHAB OR LONG-TERM CARE | DRG: 308 | End: 2020-07-07
Attending: HOSPITALIST | Admitting: HOSPITALIST
Payer: MEDICARE

## 2020-01-01 VITALS
OXYGEN SATURATION: 100 % | DIASTOLIC BLOOD PRESSURE: 47 MMHG | SYSTOLIC BLOOD PRESSURE: 135 MMHG | TEMPERATURE: 98 F | RESPIRATION RATE: 18 BRPM | HEART RATE: 69 BPM

## 2020-01-01 VITALS
OXYGEN SATURATION: 98 % | RESPIRATION RATE: 16 BRPM | HEART RATE: 88 BPM | HEIGHT: 72 IN | SYSTOLIC BLOOD PRESSURE: 155 MMHG | DIASTOLIC BLOOD PRESSURE: 67 MMHG | WEIGHT: 190.04 LBS

## 2020-01-01 DIAGNOSIS — F41.9 ANXIETY DISORDER, UNSPECIFIED: ICD-10-CM

## 2020-01-01 DIAGNOSIS — M25.561 PAIN IN RIGHT KNEE: ICD-10-CM

## 2020-01-01 DIAGNOSIS — N40.1 BENIGN PROSTATIC HYPERPLASIA WITH LOWER URINARY TRACT SYMPTOMS: ICD-10-CM

## 2020-01-01 DIAGNOSIS — I10 ESSENTIAL (PRIMARY) HYPERTENSION: ICD-10-CM

## 2020-01-01 DIAGNOSIS — N40.0 BENIGN PROSTATIC HYPERPLASIA WITHOUT LOWER URINARY TRACT SYMPTOMS: ICD-10-CM

## 2020-01-01 DIAGNOSIS — Z29.9 ENCOUNTER FOR PROPHYLACTIC MEASURES, UNSPECIFIED: ICD-10-CM

## 2020-01-01 DIAGNOSIS — T84.010D: Chronic | ICD-10-CM

## 2020-01-01 DIAGNOSIS — R62.7 ADULT FAILURE TO THRIVE: ICD-10-CM

## 2020-01-01 DIAGNOSIS — I48.91 UNSPECIFIED ATRIAL FIBRILLATION: ICD-10-CM

## 2020-01-01 DIAGNOSIS — M15.9 POLYOSTEOARTHRITIS, UNSPECIFIED: ICD-10-CM
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
## 2020-01-01 DIAGNOSIS — F03.90 UNSPECIFIED DEMENTIA WITHOUT BEHAVIORAL DISTURBANCE: ICD-10-CM

## 2020-01-01 LAB
A1C WITH ESTIMATED AVERAGE GLUCOSE RESULT: 5.2 % — SIGNIFICANT CHANGE UP (ref 4–5.6)
ALBUMIN SERPL ELPH-MCNC: 3.7 G/DL — SIGNIFICANT CHANGE UP (ref 3.5–5)
ALBUMIN SERPL ELPH-MCNC: 4 G/DL — SIGNIFICANT CHANGE UP (ref 3.5–5)
ALP SERPL-CCNC: 90 U/L — SIGNIFICANT CHANGE UP (ref 40–120)
ALP SERPL-CCNC: 97 U/L — SIGNIFICANT CHANGE UP (ref 40–120)
ALT FLD-CCNC: 22 U/L DA — SIGNIFICANT CHANGE UP (ref 10–60)
ALT FLD-CCNC: 24 U/L DA — SIGNIFICANT CHANGE UP (ref 10–60)
AMMONIA BLD-MCNC: 21 UMOL/L — SIGNIFICANT CHANGE UP (ref 11–32)
ANION GAP SERPL CALC-SCNC: 5 MMOL/L — SIGNIFICANT CHANGE UP (ref 5–17)
ANION GAP SERPL CALC-SCNC: 6 MMOL/L — SIGNIFICANT CHANGE UP (ref 5–17)
ANION GAP SERPL CALC-SCNC: 6 MMOL/L — SIGNIFICANT CHANGE UP (ref 5–17)
ANION GAP SERPL CALC-SCNC: 7 MMOL/L — SIGNIFICANT CHANGE UP (ref 5–17)
ANION GAP SERPL CALC-SCNC: 8 MMOL/L — SIGNIFICANT CHANGE UP (ref 5–17)
ANION GAP SERPL CALC-SCNC: 9 MMOL/L — SIGNIFICANT CHANGE UP (ref 5–17)
ANION GAP SERPL CALC-SCNC: 9 MMOL/L — SIGNIFICANT CHANGE UP (ref 5–17)
APPEARANCE UR: CLEAR — SIGNIFICANT CHANGE UP
APPEARANCE UR: CLEAR — SIGNIFICANT CHANGE UP
APTT BLD: 30.5 SEC — SIGNIFICANT CHANGE UP (ref 27.5–35.5)
AST SERPL-CCNC: 19 U/L — SIGNIFICANT CHANGE UP (ref 10–40)
AST SERPL-CCNC: 28 U/L — SIGNIFICANT CHANGE UP (ref 10–40)
BACTERIA # UR AUTO: ABNORMAL /HPF
BASOPHILS # BLD AUTO: 0 K/UL — SIGNIFICANT CHANGE UP (ref 0–0.2)
BASOPHILS # BLD AUTO: 0.04 K/UL — SIGNIFICANT CHANGE UP (ref 0–0.2)
BASOPHILS NFR BLD AUTO: 0 % — SIGNIFICANT CHANGE UP (ref 0–2)
BASOPHILS NFR BLD AUTO: 0.4 % — SIGNIFICANT CHANGE UP (ref 0–2)
BILIRUB SERPL-MCNC: 0.4 MG/DL — SIGNIFICANT CHANGE UP (ref 0.2–1.2)
BILIRUB SERPL-MCNC: 0.6 MG/DL — SIGNIFICANT CHANGE UP (ref 0.2–1.2)
BILIRUB UR-MCNC: NEGATIVE — SIGNIFICANT CHANGE UP
BILIRUB UR-MCNC: NEGATIVE — SIGNIFICANT CHANGE UP
BUN SERPL-MCNC: 15 MG/DL — SIGNIFICANT CHANGE UP (ref 7–18)
BUN SERPL-MCNC: 17 MG/DL — SIGNIFICANT CHANGE UP (ref 7–18)
BUN SERPL-MCNC: 17 MG/DL — SIGNIFICANT CHANGE UP (ref 7–18)
BUN SERPL-MCNC: 20 MG/DL — HIGH (ref 7–18)
BUN SERPL-MCNC: 21 MG/DL — HIGH (ref 7–18)
BUN SERPL-MCNC: 25 MG/DL — HIGH (ref 7–18)
BUN SERPL-MCNC: 26 MG/DL — HIGH (ref 7–18)
CALCIUM SERPL-MCNC: 8.5 MG/DL — SIGNIFICANT CHANGE UP (ref 8.4–10.5)
CALCIUM SERPL-MCNC: 8.5 MG/DL — SIGNIFICANT CHANGE UP (ref 8.4–10.5)
CALCIUM SERPL-MCNC: 8.6 MG/DL — SIGNIFICANT CHANGE UP (ref 8.4–10.5)
CALCIUM SERPL-MCNC: 8.7 MG/DL — SIGNIFICANT CHANGE UP (ref 8.4–10.5)
CALCIUM SERPL-MCNC: 9.1 MG/DL — SIGNIFICANT CHANGE UP (ref 8.4–10.5)
CALCIUM SERPL-MCNC: 9.2 MG/DL — SIGNIFICANT CHANGE UP (ref 8.4–10.5)
CALCIUM SERPL-MCNC: 9.4 MG/DL — SIGNIFICANT CHANGE UP (ref 8.4–10.5)
CHLORIDE SERPL-SCNC: 107 MMOL/L — SIGNIFICANT CHANGE UP (ref 96–108)
CHLORIDE SERPL-SCNC: 108 MMOL/L — SIGNIFICANT CHANGE UP (ref 96–108)
CHLORIDE SERPL-SCNC: 109 MMOL/L — HIGH (ref 96–108)
CHLORIDE SERPL-SCNC: 109 MMOL/L — HIGH (ref 96–108)
CHLORIDE SERPL-SCNC: 111 MMOL/L — HIGH (ref 96–108)
CHLORIDE SERPL-SCNC: 111 MMOL/L — HIGH (ref 96–108)
CHLORIDE SERPL-SCNC: 113 MMOL/L — HIGH (ref 96–108)
CHOLEST SERPL-MCNC: 170 MG/DL — SIGNIFICANT CHANGE UP (ref 10–199)
CK MB BLD-MCNC: 1.8 % — SIGNIFICANT CHANGE UP (ref 0–3.5)
CK MB BLD-MCNC: 2.3 % — SIGNIFICANT CHANGE UP (ref 0–3.5)
CK MB CFR SERPL CALC: 3.3 NG/ML — SIGNIFICANT CHANGE UP (ref 0–3.6)
CK MB CFR SERPL CALC: 3.8 NG/ML — HIGH (ref 0–3.6)
CK SERPL-CCNC: 162 U/L — SIGNIFICANT CHANGE UP (ref 35–232)
CK SERPL-CCNC: 185 U/L — SIGNIFICANT CHANGE UP (ref 35–232)
CO2 SERPL-SCNC: 25 MMOL/L — SIGNIFICANT CHANGE UP (ref 22–31)
CO2 SERPL-SCNC: 26 MMOL/L — SIGNIFICANT CHANGE UP (ref 22–31)
CO2 SERPL-SCNC: 27 MMOL/L — SIGNIFICANT CHANGE UP (ref 22–31)
CO2 SERPL-SCNC: 28 MMOL/L — SIGNIFICANT CHANGE UP (ref 22–31)
CO2 SERPL-SCNC: 29 MMOL/L — SIGNIFICANT CHANGE UP (ref 22–31)
COLOR SPEC: YELLOW — SIGNIFICANT CHANGE UP
COLOR SPEC: YELLOW — SIGNIFICANT CHANGE UP
CREAT SERPL-MCNC: 0.62 MG/DL — SIGNIFICANT CHANGE UP (ref 0.5–1.3)
CREAT SERPL-MCNC: 0.63 MG/DL — SIGNIFICANT CHANGE UP (ref 0.5–1.3)
CREAT SERPL-MCNC: 0.64 MG/DL — SIGNIFICANT CHANGE UP (ref 0.5–1.3)
CREAT SERPL-MCNC: 0.7 MG/DL — SIGNIFICANT CHANGE UP (ref 0.5–1.3)
CREAT SERPL-MCNC: 0.7 MG/DL — SIGNIFICANT CHANGE UP (ref 0.5–1.3)
CREAT SERPL-MCNC: 0.73 MG/DL — SIGNIFICANT CHANGE UP (ref 0.5–1.3)
CREAT SERPL-MCNC: 0.79 MG/DL — SIGNIFICANT CHANGE UP (ref 0.5–1.3)
CULTURE RESULTS: SIGNIFICANT CHANGE UP
DIFF PNL FLD: NEGATIVE — SIGNIFICANT CHANGE UP
DIFF PNL FLD: NEGATIVE — SIGNIFICANT CHANGE UP
EOSINOPHIL # BLD AUTO: 0.16 K/UL — SIGNIFICANT CHANGE UP (ref 0–0.5)
EOSINOPHIL # BLD AUTO: 0.48 K/UL — SIGNIFICANT CHANGE UP (ref 0–0.5)
EOSINOPHIL NFR BLD AUTO: 1.4 % — SIGNIFICANT CHANGE UP (ref 0–6)
EOSINOPHIL NFR BLD AUTO: 3 % — SIGNIFICANT CHANGE UP (ref 0–6)
EPI CELLS # UR: SIGNIFICANT CHANGE UP /HPF
ESTIMATED AVERAGE GLUCOSE: 103 MG/DL — SIGNIFICANT CHANGE UP (ref 68–114)
FERRITIN SERPL-MCNC: 321 NG/ML — SIGNIFICANT CHANGE UP (ref 30–400)
FOLATE SERPL-MCNC: >20 NG/ML — SIGNIFICANT CHANGE UP
GLUCOSE BLDC GLUCOMTR-MCNC: 113 MG/DL — HIGH (ref 70–99)
GLUCOSE SERPL-MCNC: 100 MG/DL — HIGH (ref 70–99)
GLUCOSE SERPL-MCNC: 103 MG/DL — HIGH (ref 70–99)
GLUCOSE SERPL-MCNC: 109 MG/DL — HIGH (ref 70–99)
GLUCOSE SERPL-MCNC: 80 MG/DL — SIGNIFICANT CHANGE UP (ref 70–99)
GLUCOSE SERPL-MCNC: 81 MG/DL — SIGNIFICANT CHANGE UP (ref 70–99)
GLUCOSE SERPL-MCNC: 90 MG/DL — SIGNIFICANT CHANGE UP (ref 70–99)
GLUCOSE SERPL-MCNC: 96 MG/DL — SIGNIFICANT CHANGE UP (ref 70–99)
GLUCOSE UR QL: NEGATIVE — SIGNIFICANT CHANGE UP
GLUCOSE UR QL: NEGATIVE — SIGNIFICANT CHANGE UP
HCT VFR BLD CALC: 33.3 % — LOW (ref 39–50)
HCT VFR BLD CALC: 34.8 % — LOW (ref 39–50)
HCT VFR BLD CALC: 35 % — LOW (ref 39–50)
HCT VFR BLD CALC: 37.7 % — LOW (ref 39–50)
HCT VFR BLD CALC: 37.9 % — LOW (ref 39–50)
HCT VFR BLD CALC: 38 % — LOW (ref 39–50)
HCT VFR BLD CALC: 41 % — SIGNIFICANT CHANGE UP (ref 39–50)
HDLC SERPL-MCNC: 70 MG/DL — SIGNIFICANT CHANGE UP
HGB BLD-MCNC: 10.9 G/DL — LOW (ref 13–17)
HGB BLD-MCNC: 11 G/DL — LOW (ref 13–17)
HGB BLD-MCNC: 11 G/DL — LOW (ref 13–17)
HGB BLD-MCNC: 11.8 G/DL — LOW (ref 13–17)
HGB BLD-MCNC: 12.1 G/DL — LOW (ref 13–17)
HGB BLD-MCNC: 12.3 G/DL — LOW (ref 13–17)
HGB BLD-MCNC: 13.4 G/DL — SIGNIFICANT CHANGE UP (ref 13–17)
HYALINE CASTS # UR AUTO: ABNORMAL /LPF
IMM GRANULOCYTES NFR BLD AUTO: 0.4 % — SIGNIFICANT CHANGE UP (ref 0–1.5)
INR BLD: 1.05 RATIO — SIGNIFICANT CHANGE UP (ref 0.88–1.16)
IRON SATN MFR SERPL: 22 % — SIGNIFICANT CHANGE UP (ref 20–55)
IRON SATN MFR SERPL: 47 UG/DL — LOW (ref 65–170)
KETONES UR-MCNC: ABNORMAL
KETONES UR-MCNC: NEGATIVE — SIGNIFICANT CHANGE UP
LEUKOCYTE ESTERASE UR-ACNC: NEGATIVE — SIGNIFICANT CHANGE UP
LEUKOCYTE ESTERASE UR-ACNC: NEGATIVE — SIGNIFICANT CHANGE UP
LIPID PNL WITH DIRECT LDL SERPL: 91 MG/DL — SIGNIFICANT CHANGE UP
LYMPHOCYTES # BLD AUTO: 29 % — SIGNIFICANT CHANGE UP (ref 13–44)
LYMPHOCYTES # BLD AUTO: 39.8 % — SIGNIFICANT CHANGE UP (ref 13–44)
LYMPHOCYTES # BLD AUTO: 4.45 K/UL — HIGH (ref 1–3.3)
LYMPHOCYTES # BLD AUTO: 4.67 K/UL — HIGH (ref 1–3.3)
MAGNESIUM SERPL-MCNC: 2.5 MG/DL — SIGNIFICANT CHANGE UP (ref 1.6–2.6)
MCHC RBC-ENTMCNC: 30.6 PG — SIGNIFICANT CHANGE UP (ref 27–34)
MCHC RBC-ENTMCNC: 30.9 PG — SIGNIFICANT CHANGE UP (ref 27–34)
MCHC RBC-ENTMCNC: 31.1 PG — SIGNIFICANT CHANGE UP (ref 27–34)
MCHC RBC-ENTMCNC: 31.2 PG — SIGNIFICANT CHANGE UP (ref 27–34)
MCHC RBC-ENTMCNC: 31.3 GM/DL — LOW (ref 32–36)
MCHC RBC-ENTMCNC: 31.3 PG — SIGNIFICANT CHANGE UP (ref 27–34)
MCHC RBC-ENTMCNC: 31.4 GM/DL — LOW (ref 32–36)
MCHC RBC-ENTMCNC: 31.5 PG — SIGNIFICANT CHANGE UP (ref 27–34)
MCHC RBC-ENTMCNC: 31.6 GM/DL — LOW (ref 32–36)
MCHC RBC-ENTMCNC: 31.6 PG — SIGNIFICANT CHANGE UP (ref 27–34)
MCHC RBC-ENTMCNC: 31.9 GM/DL — LOW (ref 32–36)
MCHC RBC-ENTMCNC: 32.4 GM/DL — SIGNIFICANT CHANGE UP (ref 32–36)
MCHC RBC-ENTMCNC: 32.7 GM/DL — SIGNIFICANT CHANGE UP (ref 32–36)
MCHC RBC-ENTMCNC: 32.7 GM/DL — SIGNIFICANT CHANGE UP (ref 32–36)
MCV RBC AUTO: 95.3 FL — SIGNIFICANT CHANGE UP (ref 80–100)
MCV RBC AUTO: 96.5 FL — SIGNIFICANT CHANGE UP (ref 80–100)
MCV RBC AUTO: 97.2 FL — SIGNIFICANT CHANGE UP (ref 80–100)
MCV RBC AUTO: 97.4 FL — SIGNIFICANT CHANGE UP (ref 80–100)
MCV RBC AUTO: 97.8 FL — SIGNIFICANT CHANGE UP (ref 80–100)
MCV RBC AUTO: 97.9 FL — SIGNIFICANT CHANGE UP (ref 80–100)
MCV RBC AUTO: 99.7 FL — SIGNIFICANT CHANGE UP (ref 80–100)
MONOCYTES # BLD AUTO: 0.66 K/UL — SIGNIFICANT CHANGE UP (ref 0–0.9)
MONOCYTES # BLD AUTO: 0.81 K/UL — SIGNIFICANT CHANGE UP (ref 0–0.9)
MONOCYTES NFR BLD AUTO: 5 % — SIGNIFICANT CHANGE UP (ref 2–14)
MONOCYTES NFR BLD AUTO: 5.9 % — SIGNIFICANT CHANGE UP (ref 2–14)
NEUTROPHILS # BLD AUTO: 5.82 K/UL — SIGNIFICANT CHANGE UP (ref 1.8–7.4)
NEUTROPHILS # BLD AUTO: 9.83 K/UL — HIGH (ref 1.8–7.4)
NEUTROPHILS NFR BLD AUTO: 52.1 % — SIGNIFICANT CHANGE UP (ref 43–77)
NEUTROPHILS NFR BLD AUTO: 60 % — SIGNIFICANT CHANGE UP (ref 43–77)
NITRITE UR-MCNC: NEGATIVE — SIGNIFICANT CHANGE UP
NITRITE UR-MCNC: NEGATIVE — SIGNIFICANT CHANGE UP
NRBC # BLD: 0 /100 WBCS — SIGNIFICANT CHANGE UP (ref 0–0)
PH UR: 6 — SIGNIFICANT CHANGE UP (ref 5–8)
PH UR: 8 — SIGNIFICANT CHANGE UP (ref 5–8)
PHOSPHATE SERPL-MCNC: 3.2 MG/DL — SIGNIFICANT CHANGE UP (ref 2.5–4.5)
PLATELET # BLD AUTO: 200 K/UL — SIGNIFICANT CHANGE UP (ref 150–400)
PLATELET # BLD AUTO: 235 K/UL — SIGNIFICANT CHANGE UP (ref 150–400)
PLATELET # BLD AUTO: 260 K/UL — SIGNIFICANT CHANGE UP (ref 150–400)
PLATELET # BLD AUTO: 266 K/UL — SIGNIFICANT CHANGE UP (ref 150–400)
PLATELET # BLD AUTO: 287 K/UL — SIGNIFICANT CHANGE UP (ref 150–400)
PLATELET # BLD AUTO: 296 K/UL — SIGNIFICANT CHANGE UP (ref 150–400)
PLATELET # BLD AUTO: 324 K/UL — SIGNIFICANT CHANGE UP (ref 150–400)
POTASSIUM SERPL-MCNC: 3.2 MMOL/L — LOW (ref 3.5–5.3)
POTASSIUM SERPL-MCNC: 3.4 MMOL/L — LOW (ref 3.5–5.3)
POTASSIUM SERPL-MCNC: 3.7 MMOL/L — SIGNIFICANT CHANGE UP (ref 3.5–5.3)
POTASSIUM SERPL-MCNC: 3.8 MMOL/L — SIGNIFICANT CHANGE UP (ref 3.5–5.3)
POTASSIUM SERPL-MCNC: 4 MMOL/L — SIGNIFICANT CHANGE UP (ref 3.5–5.3)
POTASSIUM SERPL-MCNC: 4 MMOL/L — SIGNIFICANT CHANGE UP (ref 3.5–5.3)
POTASSIUM SERPL-MCNC: 5.1 MMOL/L — SIGNIFICANT CHANGE UP (ref 3.5–5.3)
POTASSIUM SERPL-SCNC: 3.2 MMOL/L — LOW (ref 3.5–5.3)
POTASSIUM SERPL-SCNC: 3.4 MMOL/L — LOW (ref 3.5–5.3)
POTASSIUM SERPL-SCNC: 3.7 MMOL/L — SIGNIFICANT CHANGE UP (ref 3.5–5.3)
POTASSIUM SERPL-SCNC: 3.8 MMOL/L — SIGNIFICANT CHANGE UP (ref 3.5–5.3)
POTASSIUM SERPL-SCNC: 4 MMOL/L — SIGNIFICANT CHANGE UP (ref 3.5–5.3)
POTASSIUM SERPL-SCNC: 4 MMOL/L — SIGNIFICANT CHANGE UP (ref 3.5–5.3)
POTASSIUM SERPL-SCNC: 5.1 MMOL/L — SIGNIFICANT CHANGE UP (ref 3.5–5.3)
PROT SERPL-MCNC: 7 G/DL — SIGNIFICANT CHANGE UP (ref 6–8.3)
PROT SERPL-MCNC: 7.5 G/DL — SIGNIFICANT CHANGE UP (ref 6–8.3)
PROT UR-MCNC: 30 MG/DL
PROT UR-MCNC: NEGATIVE — SIGNIFICANT CHANGE UP
PROTHROM AB SERPL-ACNC: 12.3 SEC — SIGNIFICANT CHANGE UP (ref 10.6–13.6)
RBC # BLD: 3.45 M/UL — LOW (ref 4.2–5.8)
RBC # BLD: 3.51 M/UL — LOW (ref 4.2–5.8)
RBC # BLD: 3.56 M/UL — LOW (ref 4.2–5.8)
RBC # BLD: 3.85 M/UL — LOW (ref 4.2–5.8)
RBC # BLD: 3.89 M/UL — LOW (ref 4.2–5.8)
RBC # BLD: 3.91 M/UL — LOW (ref 4.2–5.8)
RBC # BLD: 4.3 M/UL — SIGNIFICANT CHANGE UP (ref 4.2–5.8)
RBC # FLD: 13.7 % — SIGNIFICANT CHANGE UP (ref 10.3–14.5)
RBC # FLD: 14 % — SIGNIFICANT CHANGE UP (ref 10.3–14.5)
RBC # FLD: 14.1 % — SIGNIFICANT CHANGE UP (ref 10.3–14.5)
RBC # FLD: 14.1 % — SIGNIFICANT CHANGE UP (ref 10.3–14.5)
RBC # FLD: 14.3 % — SIGNIFICANT CHANGE UP (ref 10.3–14.5)
RBC CASTS # UR COMP ASSIST: SIGNIFICANT CHANGE UP /HPF (ref 0–2)
SARS-COV-2 IGG SERPL QL IA: NEGATIVE — SIGNIFICANT CHANGE UP
SARS-COV-2 IGM SERPL IA-ACNC: <0.1 INDEX — SIGNIFICANT CHANGE UP
SARS-COV-2 RNA SPEC QL NAA+PROBE: SIGNIFICANT CHANGE UP
SARS-COV-2 RNA SPEC QL NAA+PROBE: SIGNIFICANT CHANGE UP
SODIUM SERPL-SCNC: 140 MMOL/L — SIGNIFICANT CHANGE UP (ref 135–145)
SODIUM SERPL-SCNC: 143 MMOL/L — SIGNIFICANT CHANGE UP (ref 135–145)
SODIUM SERPL-SCNC: 143 MMOL/L — SIGNIFICANT CHANGE UP (ref 135–145)
SODIUM SERPL-SCNC: 144 MMOL/L — SIGNIFICANT CHANGE UP (ref 135–145)
SODIUM SERPL-SCNC: 145 MMOL/L — SIGNIFICANT CHANGE UP (ref 135–145)
SODIUM SERPL-SCNC: 146 MMOL/L — HIGH (ref 135–145)
SODIUM SERPL-SCNC: 146 MMOL/L — HIGH (ref 135–145)
SP GR SPEC: 1.01 — SIGNIFICANT CHANGE UP (ref 1.01–1.02)
SP GR SPEC: 1.02 — SIGNIFICANT CHANGE UP (ref 1.01–1.02)
SPECIMEN SOURCE: SIGNIFICANT CHANGE UP
T PALLIDUM AB TITR SER: NEGATIVE — SIGNIFICANT CHANGE UP
TIBC SERPL-MCNC: 218 UG/DL — LOW (ref 250–450)
TOTAL CHOLESTEROL/HDL RATIO MEASUREMENT: 2.4 RATIO — LOW (ref 3.4–9.6)
TRIGL SERPL-MCNC: 44 MG/DL — SIGNIFICANT CHANGE UP (ref 10–149)
TROPONIN I SERPL-MCNC: 0.02 NG/ML — SIGNIFICANT CHANGE UP (ref 0–0.04)
TROPONIN I SERPL-MCNC: <0.015 NG/ML — SIGNIFICANT CHANGE UP (ref 0–0.04)
TSH SERPL-MCNC: 1.72 UU/ML — SIGNIFICANT CHANGE UP (ref 0.34–4.82)
UIBC SERPL-MCNC: 171 UG/DL — SIGNIFICANT CHANGE UP (ref 110–370)
UROBILINOGEN FLD QL: NEGATIVE — SIGNIFICANT CHANGE UP
UROBILINOGEN FLD QL: NEGATIVE — SIGNIFICANT CHANGE UP
VIT B12 SERPL-MCNC: 519 PG/ML — SIGNIFICANT CHANGE UP (ref 232–1245)
WBC # BLD: 10.45 K/UL — SIGNIFICANT CHANGE UP (ref 3.8–10.5)
WBC # BLD: 10.52 K/UL — HIGH (ref 3.8–10.5)
WBC # BLD: 11.17 K/UL — HIGH (ref 3.8–10.5)
WBC # BLD: 11.43 K/UL — HIGH (ref 3.8–10.5)
WBC # BLD: 12.15 K/UL — HIGH (ref 3.8–10.5)
WBC # BLD: 16.12 K/UL — HIGH (ref 3.8–10.5)
WBC # BLD: 16.76 K/UL — HIGH (ref 3.8–10.5)
WBC # FLD AUTO: 10.45 K/UL — SIGNIFICANT CHANGE UP (ref 3.8–10.5)
WBC # FLD AUTO: 10.52 K/UL — HIGH (ref 3.8–10.5)
WBC # FLD AUTO: 11.17 K/UL — HIGH (ref 3.8–10.5)
WBC # FLD AUTO: 11.43 K/UL — HIGH (ref 3.8–10.5)
WBC # FLD AUTO: 12.15 K/UL — HIGH (ref 3.8–10.5)
WBC # FLD AUTO: 16.12 K/UL — HIGH (ref 3.8–10.5)
WBC # FLD AUTO: 16.76 K/UL — HIGH (ref 3.8–10.5)
WBC UR QL: SIGNIFICANT CHANGE UP /HPF (ref 0–5)

## 2020-01-01 PROCEDURE — 93005 ELECTROCARDIOGRAM TRACING: CPT

## 2020-01-01 PROCEDURE — 99231 SBSQ HOSP IP/OBS SF/LOW 25: CPT

## 2020-01-01 PROCEDURE — 76770 US EXAM ABDO BACK WALL COMP: CPT | Mod: 26

## 2020-01-01 PROCEDURE — 87086 URINE CULTURE/COLONY COUNT: CPT

## 2020-01-01 PROCEDURE — 99233 SBSQ HOSP IP/OBS HIGH 50: CPT | Mod: GC

## 2020-01-01 PROCEDURE — 97110 THERAPEUTIC EXERCISES: CPT

## 2020-01-01 PROCEDURE — 71045 X-RAY EXAM CHEST 1 VIEW: CPT | Mod: 26

## 2020-01-01 PROCEDURE — 73562 X-RAY EXAM OF KNEE 3: CPT

## 2020-01-01 PROCEDURE — 93306 TTE W/DOPPLER COMPLETE: CPT

## 2020-01-01 PROCEDURE — 84443 ASSAY THYROID STIM HORMONE: CPT

## 2020-01-01 PROCEDURE — 70450 CT HEAD/BRAIN W/O DYE: CPT | Mod: 26

## 2020-01-01 PROCEDURE — 73610 X-RAY EXAM OF ANKLE: CPT

## 2020-01-01 PROCEDURE — 97530 THERAPEUTIC ACTIVITIES: CPT

## 2020-01-01 PROCEDURE — 86769 SARS-COV-2 COVID-19 ANTIBODY: CPT

## 2020-01-01 PROCEDURE — 99223 1ST HOSP IP/OBS HIGH 75: CPT

## 2020-01-01 PROCEDURE — 82550 ASSAY OF CK (CPK): CPT

## 2020-01-01 PROCEDURE — 81001 URINALYSIS AUTO W/SCOPE: CPT

## 2020-01-01 PROCEDURE — 85027 COMPLETE CBC AUTOMATED: CPT

## 2020-01-01 PROCEDURE — 70450 CT HEAD/BRAIN W/O DYE: CPT

## 2020-01-01 PROCEDURE — 99222 1ST HOSP IP/OBS MODERATE 55: CPT

## 2020-01-01 PROCEDURE — 73610 X-RAY EXAM OF ANKLE: CPT | Mod: 26,RT

## 2020-01-01 PROCEDURE — 82962 GLUCOSE BLOOD TEST: CPT

## 2020-01-01 PROCEDURE — 82140 ASSAY OF AMMONIA: CPT

## 2020-01-01 PROCEDURE — 80061 LIPID PANEL: CPT

## 2020-01-01 PROCEDURE — 80053 COMPREHEN METABOLIC PANEL: CPT

## 2020-01-01 PROCEDURE — 36415 COLL VENOUS BLD VENIPUNCTURE: CPT

## 2020-01-01 PROCEDURE — 71045 X-RAY EXAM CHEST 1 VIEW: CPT

## 2020-01-01 PROCEDURE — 99232 SBSQ HOSP IP/OBS MODERATE 35: CPT | Mod: GC

## 2020-01-01 PROCEDURE — 92610 EVALUATE SWALLOWING FUNCTION: CPT

## 2020-01-01 PROCEDURE — 82607 VITAMIN B-12: CPT

## 2020-01-01 PROCEDURE — 99285 EMERGENCY DEPT VISIT HI MDM: CPT | Mod: 25

## 2020-01-01 PROCEDURE — 82728 ASSAY OF FERRITIN: CPT

## 2020-01-01 PROCEDURE — 76770 US EXAM ABDO BACK WALL COMP: CPT

## 2020-01-01 PROCEDURE — 84100 ASSAY OF PHOSPHORUS: CPT

## 2020-01-01 PROCEDURE — 84484 ASSAY OF TROPONIN QUANT: CPT

## 2020-01-01 PROCEDURE — 85730 THROMBOPLASTIN TIME PARTIAL: CPT

## 2020-01-01 PROCEDURE — 72170 X-RAY EXAM OF PELVIS: CPT

## 2020-01-01 PROCEDURE — 83735 ASSAY OF MAGNESIUM: CPT

## 2020-01-01 PROCEDURE — 80048 BASIC METABOLIC PNL TOTAL CA: CPT

## 2020-01-01 PROCEDURE — 99222 1ST HOSP IP/OBS MODERATE 55: CPT | Mod: AI,GC

## 2020-01-01 PROCEDURE — 83036 HEMOGLOBIN GLYCOSYLATED A1C: CPT

## 2020-01-01 PROCEDURE — 73562 X-RAY EXAM OF KNEE 3: CPT | Mod: 26,RT

## 2020-01-01 PROCEDURE — 81003 URINALYSIS AUTO W/O SCOPE: CPT

## 2020-01-01 PROCEDURE — 72170 X-RAY EXAM OF PELVIS: CPT | Mod: 26

## 2020-01-01 PROCEDURE — 82553 CREATINE MB FRACTION: CPT

## 2020-01-01 PROCEDURE — 99283 EMERGENCY DEPT VISIT LOW MDM: CPT

## 2020-01-01 PROCEDURE — U0003: CPT

## 2020-01-01 PROCEDURE — 99232 SBSQ HOSP IP/OBS MODERATE 35: CPT

## 2020-01-01 PROCEDURE — 83540 ASSAY OF IRON: CPT

## 2020-01-01 PROCEDURE — 85610 PROTHROMBIN TIME: CPT

## 2020-01-01 PROCEDURE — 93306 TTE W/DOPPLER COMPLETE: CPT | Mod: 26

## 2020-01-01 PROCEDURE — 86780 TREPONEMA PALLIDUM: CPT

## 2020-01-01 PROCEDURE — 83550 IRON BINDING TEST: CPT

## 2020-01-01 PROCEDURE — 82746 ASSAY OF FOLIC ACID SERUM: CPT

## 2020-01-01 PROCEDURE — 97162 PT EVAL MOD COMPLEX 30 MIN: CPT

## 2020-01-01 RX ORDER — AMPICILLIN SODIUM AND SULBACTAM SODIUM 250; 125 MG/ML; MG/ML
1.5 INJECTION, POWDER, FOR SUSPENSION INTRAMUSCULAR; INTRAVENOUS EVERY 6 HOURS
Refills: 0 | Status: DISCONTINUED | OUTPATIENT
Start: 2020-01-01 | End: 2020-01-01

## 2020-01-01 RX ORDER — SODIUM CHLORIDE 9 MG/ML
1000 INJECTION INTRAMUSCULAR; INTRAVENOUS; SUBCUTANEOUS ONCE
Refills: 0 | Status: COMPLETED | OUTPATIENT
Start: 2020-01-01 | End: 2020-01-01

## 2020-01-01 RX ORDER — METOPROLOL TARTRATE 50 MG
12.5 TABLET ORAL
Refills: 0 | Status: DISCONTINUED | OUTPATIENT
Start: 2020-01-01 | End: 2020-01-01

## 2020-01-01 RX ORDER — LIDOCAINE 4 G/100G
1 CREAM TOPICAL
Qty: 0 | Refills: 0 | DISCHARGE

## 2020-01-01 RX ORDER — ZINC GLUCONATE 30 MG
1 TABLET ORAL
Qty: 0 | Refills: 0 | DISCHARGE

## 2020-01-01 RX ORDER — CHOLECALCIFEROL (VITAMIN D3) 125 MCG
1000 CAPSULE ORAL DAILY
Refills: 0 | Status: DISCONTINUED | OUTPATIENT
Start: 2020-01-01 | End: 2020-01-01

## 2020-01-01 RX ORDER — ACETAMINOPHEN 500 MG
650 TABLET ORAL EVERY 8 HOURS
Refills: 0 | Status: COMPLETED | OUTPATIENT
Start: 2020-01-01 | End: 2020-01-01

## 2020-01-01 RX ORDER — LIDOCAINE 4 G/100G
2 CREAM TOPICAL DAILY
Refills: 0 | Status: DISCONTINUED | OUTPATIENT
Start: 2020-01-01 | End: 2020-01-01

## 2020-01-01 RX ORDER — MELOXICAM 15 MG/1
0 TABLET ORAL
Qty: 0 | Refills: 0 | DISCHARGE

## 2020-01-01 RX ORDER — ENOXAPARIN SODIUM 100 MG/ML
40 INJECTION SUBCUTANEOUS DAILY
Refills: 0 | Status: DISCONTINUED | OUTPATIENT
Start: 2020-01-01 | End: 2020-01-01

## 2020-01-01 RX ORDER — ASPIRIN/CALCIUM CARB/MAGNESIUM 324 MG
81 TABLET ORAL DAILY
Refills: 0 | Status: DISCONTINUED | OUTPATIENT
Start: 2020-01-01 | End: 2020-01-01

## 2020-01-01 RX ORDER — POTASSIUM CHLORIDE 20 MEQ
40 PACKET (EA) ORAL EVERY 4 HOURS
Refills: 0 | Status: COMPLETED | OUTPATIENT
Start: 2020-01-01 | End: 2020-01-01

## 2020-01-01 RX ORDER — MIRTAZAPINE 45 MG/1
15 TABLET, ORALLY DISINTEGRATING ORAL AT BEDTIME
Refills: 0 | Status: DISCONTINUED | OUTPATIENT
Start: 2020-01-01 | End: 2020-01-01

## 2020-01-01 RX ORDER — ACETAMINOPHEN 500 MG
2 TABLET ORAL
Qty: 0 | Refills: 0 | DISCHARGE
Start: 2020-01-01

## 2020-01-01 RX ORDER — ACETAMINOPHEN 500 MG
650 TABLET ORAL ONCE
Refills: 0 | Status: DISCONTINUED | OUTPATIENT
Start: 2020-01-01 | End: 2020-01-01

## 2020-01-01 RX ORDER — MIRTAZAPINE 45 MG/1
1 TABLET, ORALLY DISINTEGRATING ORAL
Qty: 0 | Refills: 0 | DISCHARGE

## 2020-01-01 RX ORDER — LISINOPRIL 2.5 MG/1
1 TABLET ORAL
Qty: 0 | Refills: 0 | DISCHARGE

## 2020-01-01 RX ORDER — LISINOPRIL 2.5 MG/1
0.5 TABLET ORAL
Qty: 0 | Refills: 0 | DISCHARGE

## 2020-01-01 RX ORDER — TAMSULOSIN HYDROCHLORIDE 0.4 MG/1
0.4 CAPSULE ORAL AT BEDTIME
Refills: 0 | Status: DISCONTINUED | OUTPATIENT
Start: 2020-01-01 | End: 2020-01-01

## 2020-01-01 RX ORDER — POTASSIUM CHLORIDE 20 MEQ
40 PACKET (EA) ORAL EVERY 4 HOURS
Refills: 0 | Status: DISCONTINUED | OUTPATIENT
Start: 2020-01-01 | End: 2020-01-01

## 2020-01-01 RX ORDER — SODIUM CHLORIDE 9 MG/ML
1000 INJECTION INTRAMUSCULAR; INTRAVENOUS; SUBCUTANEOUS
Refills: 0 | Status: DISCONTINUED | OUTPATIENT
Start: 2020-01-01 | End: 2020-01-01

## 2020-01-01 RX ORDER — LIDOCAINE 4 G/100G
1 CREAM TOPICAL
Qty: 0 | Refills: 0 | DISCHARGE
Start: 2020-01-01

## 2020-01-01 RX ORDER — LISINOPRIL 2.5 MG/1
1 TABLET ORAL
Qty: 0 | Refills: 0 | DISCHARGE
Start: 2020-01-01

## 2020-01-01 RX ORDER — FUROSEMIDE 40 MG
1 TABLET ORAL
Qty: 0 | Refills: 0 | DISCHARGE

## 2020-01-01 RX ORDER — AMPICILLIN SODIUM AND SULBACTAM SODIUM 250; 125 MG/ML; MG/ML
1.5 INJECTION, POWDER, FOR SUSPENSION INTRAMUSCULAR; INTRAVENOUS ONCE
Refills: 0 | Status: COMPLETED | OUTPATIENT
Start: 2020-01-01 | End: 2020-01-01

## 2020-01-01 RX ORDER — ACETAMINOPHEN 500 MG
650 TABLET ORAL EVERY 6 HOURS
Refills: 0 | Status: DISCONTINUED | OUTPATIENT
Start: 2020-01-01 | End: 2020-01-01

## 2020-01-01 RX ORDER — LISINOPRIL 2.5 MG/1
5 TABLET ORAL DAILY
Refills: 0 | Status: DISCONTINUED | OUTPATIENT
Start: 2020-01-01 | End: 2020-01-01

## 2020-01-01 RX ORDER — FERROUS SULFATE 325(65) MG
1 TABLET ORAL
Qty: 0 | Refills: 0 | DISCHARGE

## 2020-01-01 RX ORDER — CHOLECALCIFEROL (VITAMIN D3) 125 MCG
1000 CAPSULE ORAL
Qty: 0 | Refills: 0 | DISCHARGE
Start: 2020-01-01

## 2020-01-01 RX ORDER — TAMSULOSIN HYDROCHLORIDE 0.4 MG/1
1 CAPSULE ORAL
Qty: 0 | Refills: 0 | DISCHARGE

## 2020-01-01 RX ORDER — ASPIRIN/CALCIUM CARB/MAGNESIUM 324 MG
1 TABLET ORAL
Qty: 0 | Refills: 0 | DISCHARGE
Start: 2020-01-01

## 2020-01-01 RX ORDER — AMPICILLIN SODIUM AND SULBACTAM SODIUM 250; 125 MG/ML; MG/ML
INJECTION, POWDER, FOR SUSPENSION INTRAMUSCULAR; INTRAVENOUS
Refills: 0 | Status: DISCONTINUED | OUTPATIENT
Start: 2020-01-01 | End: 2020-01-01

## 2020-01-01 RX ADMIN — Medication 12.5 MILLIGRAM(S): at 05:55

## 2020-01-01 RX ADMIN — AMPICILLIN SODIUM AND SULBACTAM SODIUM 100 GRAM(S): 250; 125 INJECTION, POWDER, FOR SUSPENSION INTRAMUSCULAR; INTRAVENOUS at 17:09

## 2020-01-01 RX ADMIN — LIDOCAINE 2 PATCH: 4 CREAM TOPICAL at 20:09

## 2020-01-01 RX ADMIN — AMPICILLIN SODIUM AND SULBACTAM SODIUM 100 GRAM(S): 250; 125 INJECTION, POWDER, FOR SUSPENSION INTRAMUSCULAR; INTRAVENOUS at 11:45

## 2020-01-01 RX ADMIN — LIDOCAINE 2 PATCH: 4 CREAM TOPICAL at 20:30

## 2020-01-01 RX ADMIN — MIRTAZAPINE 15 MILLIGRAM(S): 45 TABLET, ORALLY DISINTEGRATING ORAL at 21:23

## 2020-01-01 RX ADMIN — AMPICILLIN SODIUM AND SULBACTAM SODIUM 100 GRAM(S): 250; 125 INJECTION, POWDER, FOR SUSPENSION INTRAMUSCULAR; INTRAVENOUS at 00:25

## 2020-01-01 RX ADMIN — TAMSULOSIN HYDROCHLORIDE 0.4 MILLIGRAM(S): 0.4 CAPSULE ORAL at 21:34

## 2020-01-01 RX ADMIN — MIRTAZAPINE 15 MILLIGRAM(S): 45 TABLET, ORALLY DISINTEGRATING ORAL at 21:51

## 2020-01-01 RX ADMIN — AMPICILLIN SODIUM AND SULBACTAM SODIUM 100 GRAM(S): 250; 125 INJECTION, POWDER, FOR SUSPENSION INTRAMUSCULAR; INTRAVENOUS at 00:00

## 2020-01-01 RX ADMIN — Medication 650 MILLIGRAM(S): at 14:15

## 2020-01-01 RX ADMIN — AMPICILLIN SODIUM AND SULBACTAM SODIUM 100 GRAM(S): 250; 125 INJECTION, POWDER, FOR SUSPENSION INTRAMUSCULAR; INTRAVENOUS at 18:16

## 2020-01-01 RX ADMIN — LISINOPRIL 5 MILLIGRAM(S): 2.5 TABLET ORAL at 05:55

## 2020-01-01 RX ADMIN — TAMSULOSIN HYDROCHLORIDE 0.4 MILLIGRAM(S): 0.4 CAPSULE ORAL at 21:54

## 2020-01-01 RX ADMIN — LIDOCAINE 2 PATCH: 4 CREAM TOPICAL at 20:14

## 2020-01-01 RX ADMIN — Medication 650 MILLIGRAM(S): at 21:32

## 2020-01-01 RX ADMIN — LISINOPRIL 5 MILLIGRAM(S): 2.5 TABLET ORAL at 05:20

## 2020-01-01 RX ADMIN — LIDOCAINE 2 PATCH: 4 CREAM TOPICAL at 21:11

## 2020-01-01 RX ADMIN — AMPICILLIN SODIUM AND SULBACTAM SODIUM 100 GRAM(S): 250; 125 INJECTION, POWDER, FOR SUSPENSION INTRAMUSCULAR; INTRAVENOUS at 05:19

## 2020-01-01 RX ADMIN — Medication 650 MILLIGRAM(S): at 05:05

## 2020-01-01 RX ADMIN — AMPICILLIN SODIUM AND SULBACTAM SODIUM 100 GRAM(S): 250; 125 INJECTION, POWDER, FOR SUSPENSION INTRAMUSCULAR; INTRAVENOUS at 05:20

## 2020-01-01 RX ADMIN — LIDOCAINE 2 PATCH: 4 CREAM TOPICAL at 11:32

## 2020-01-01 RX ADMIN — Medication 81 MILLIGRAM(S): at 11:25

## 2020-01-01 RX ADMIN — AMPICILLIN SODIUM AND SULBACTAM SODIUM 100 GRAM(S): 250; 125 INJECTION, POWDER, FOR SUSPENSION INTRAMUSCULAR; INTRAVENOUS at 06:38

## 2020-01-01 RX ADMIN — ENOXAPARIN SODIUM 40 MILLIGRAM(S): 100 INJECTION SUBCUTANEOUS at 11:32

## 2020-01-01 RX ADMIN — TAMSULOSIN HYDROCHLORIDE 0.4 MILLIGRAM(S): 0.4 CAPSULE ORAL at 21:32

## 2020-01-01 RX ADMIN — ENOXAPARIN SODIUM 40 MILLIGRAM(S): 100 INJECTION SUBCUTANEOUS at 11:12

## 2020-01-01 RX ADMIN — TAMSULOSIN HYDROCHLORIDE 0.4 MILLIGRAM(S): 0.4 CAPSULE ORAL at 21:37

## 2020-01-01 RX ADMIN — Medication 40 MILLIEQUIVALENT(S): at 15:06

## 2020-01-01 RX ADMIN — Medication 650 MILLIGRAM(S): at 21:55

## 2020-01-01 RX ADMIN — AMPICILLIN SODIUM AND SULBACTAM SODIUM 100 GRAM(S): 250; 125 INJECTION, POWDER, FOR SUSPENSION INTRAMUSCULAR; INTRAVENOUS at 23:28

## 2020-01-01 RX ADMIN — Medication 650 MILLIGRAM(S): at 15:20

## 2020-01-01 RX ADMIN — ENOXAPARIN SODIUM 40 MILLIGRAM(S): 100 INJECTION SUBCUTANEOUS at 11:47

## 2020-01-01 RX ADMIN — TAMSULOSIN HYDROCHLORIDE 0.4 MILLIGRAM(S): 0.4 CAPSULE ORAL at 21:51

## 2020-01-01 RX ADMIN — Medication 650 MILLIGRAM(S): at 05:22

## 2020-01-01 RX ADMIN — Medication 100 MILLIGRAM(S): at 21:24

## 2020-01-01 RX ADMIN — Medication 650 MILLIGRAM(S): at 21:51

## 2020-01-01 RX ADMIN — Medication 100 MILLIGRAM(S): at 05:49

## 2020-01-01 RX ADMIN — ENOXAPARIN SODIUM 40 MILLIGRAM(S): 100 INJECTION SUBCUTANEOUS at 12:36

## 2020-01-01 RX ADMIN — Medication 81 MILLIGRAM(S): at 11:31

## 2020-01-01 RX ADMIN — MIRTAZAPINE 15 MILLIGRAM(S): 45 TABLET, ORALLY DISINTEGRATING ORAL at 21:37

## 2020-01-01 RX ADMIN — MIRTAZAPINE 15 MILLIGRAM(S): 45 TABLET, ORALLY DISINTEGRATING ORAL at 21:34

## 2020-01-01 RX ADMIN — LIDOCAINE 2 PATCH: 4 CREAM TOPICAL at 04:25

## 2020-01-01 RX ADMIN — MIRTAZAPINE 15 MILLIGRAM(S): 45 TABLET, ORALLY DISINTEGRATING ORAL at 21:54

## 2020-01-01 RX ADMIN — LIDOCAINE 2 PATCH: 4 CREAM TOPICAL at 11:58

## 2020-01-01 RX ADMIN — Medication 81 MILLIGRAM(S): at 11:46

## 2020-01-01 RX ADMIN — Medication 1000 UNIT(S): at 11:11

## 2020-01-01 RX ADMIN — LIDOCAINE 2 PATCH: 4 CREAM TOPICAL at 23:55

## 2020-01-01 RX ADMIN — SODIUM CHLORIDE 1000 MILLILITER(S): 9 INJECTION INTRAMUSCULAR; INTRAVENOUS; SUBCUTANEOUS at 20:13

## 2020-01-01 RX ADMIN — AMPICILLIN SODIUM AND SULBACTAM SODIUM 100 GRAM(S): 250; 125 INJECTION, POWDER, FOR SUSPENSION INTRAMUSCULAR; INTRAVENOUS at 17:55

## 2020-01-01 RX ADMIN — LIDOCAINE 2 PATCH: 4 CREAM TOPICAL at 23:29

## 2020-01-01 RX ADMIN — LIDOCAINE 2 PATCH: 4 CREAM TOPICAL at 11:11

## 2020-01-01 RX ADMIN — Medication 1000 UNIT(S): at 15:22

## 2020-01-01 RX ADMIN — LIDOCAINE 2 PATCH: 4 CREAM TOPICAL at 15:21

## 2020-01-01 RX ADMIN — LISINOPRIL 5 MILLIGRAM(S): 2.5 TABLET ORAL at 05:48

## 2020-01-01 RX ADMIN — AMPICILLIN SODIUM AND SULBACTAM SODIUM 100 GRAM(S): 250; 125 INJECTION, POWDER, FOR SUSPENSION INTRAMUSCULAR; INTRAVENOUS at 11:11

## 2020-01-01 RX ADMIN — TAMSULOSIN HYDROCHLORIDE 0.4 MILLIGRAM(S): 0.4 CAPSULE ORAL at 21:23

## 2020-01-01 RX ADMIN — SODIUM CHLORIDE 60 MILLILITER(S): 9 INJECTION INTRAMUSCULAR; INTRAVENOUS; SUBCUTANEOUS at 01:15

## 2020-01-01 RX ADMIN — AMPICILLIN SODIUM AND SULBACTAM SODIUM 100 GRAM(S): 250; 125 INJECTION, POWDER, FOR SUSPENSION INTRAMUSCULAR; INTRAVENOUS at 11:34

## 2020-01-01 RX ADMIN — AMPICILLIN SODIUM AND SULBACTAM SODIUM 100 GRAM(S): 250; 125 INJECTION, POWDER, FOR SUSPENSION INTRAMUSCULAR; INTRAVENOUS at 05:06

## 2020-01-01 RX ADMIN — Medication 81 MILLIGRAM(S): at 11:11

## 2020-01-01 RX ADMIN — Medication 650 MILLIGRAM(S): at 15:40

## 2020-01-01 RX ADMIN — AMPICILLIN SODIUM AND SULBACTAM SODIUM 100 GRAM(S): 250; 125 INJECTION, POWDER, FOR SUSPENSION INTRAMUSCULAR; INTRAVENOUS at 17:22

## 2020-01-01 RX ADMIN — AMPICILLIN SODIUM AND SULBACTAM SODIUM 100 GRAM(S): 250; 125 INJECTION, POWDER, FOR SUSPENSION INTRAMUSCULAR; INTRAVENOUS at 23:15

## 2020-01-01 RX ADMIN — Medication 1000 UNIT(S): at 11:31

## 2020-01-01 RX ADMIN — MIRTAZAPINE 15 MILLIGRAM(S): 45 TABLET, ORALLY DISINTEGRATING ORAL at 21:32

## 2020-01-01 RX ADMIN — LISINOPRIL 5 MILLIGRAM(S): 2.5 TABLET ORAL at 06:20

## 2020-01-01 RX ADMIN — Medication 650 MILLIGRAM(S): at 05:19

## 2020-01-01 RX ADMIN — Medication 81 MILLIGRAM(S): at 12:33

## 2020-01-01 RX ADMIN — Medication 1000 UNIT(S): at 11:46

## 2020-01-01 RX ADMIN — Medication 1000 UNIT(S): at 11:32

## 2020-01-01 RX ADMIN — Medication 40 MILLIEQUIVALENT(S): at 16:49

## 2020-01-01 RX ADMIN — LISINOPRIL 5 MILLIGRAM(S): 2.5 TABLET ORAL at 05:19

## 2020-01-01 RX ADMIN — ENOXAPARIN SODIUM 40 MILLIGRAM(S): 100 INJECTION SUBCUTANEOUS at 11:25

## 2020-01-01 RX ADMIN — ENOXAPARIN SODIUM 40 MILLIGRAM(S): 100 INJECTION SUBCUTANEOUS at 11:31

## 2020-01-01 RX ADMIN — LIDOCAINE 2 PATCH: 4 CREAM TOPICAL at 23:15

## 2020-01-01 RX ADMIN — Medication 81 MILLIGRAM(S): at 11:32

## 2020-07-01 PROBLEM — Z86.79 PERSONAL HISTORY OF OTHER DISEASES OF THE CIRCULATORY SYSTEM: Chronic | Status: ACTIVE | Noted: 2018-01-06

## 2020-07-01 NOTE — ED ADULT NURSE NOTE - CHIEF COMPLAINT QUOTE
s/p fall , as per ems no LOC , right knee buckled and pt landed on his buttocks.   contact number - [facility] [phone]

## 2020-07-01 NOTE — ED PROVIDER NOTE - PROGRESS NOTE DETAILS
Patient is resting comfortably, NAD. Awaiting XR. I spoke with patient's daughter Nazanin via telephone with patient's permission. Patient has not fallen in a long time. Patient unable to stand with support. Spoke with daughter again. Now reports walking has been worsening for the past 2 weeks. PMD is Dr. Phelps Patient is resting comfortably, NAD. EKG shows new onset afib, could account for patient's weakness. WBC count elevated but normal UA and CXR, afebrile. Will hold anticoagulation pending cardiology evaluation given fall risk.

## 2020-07-01 NOTE — H&P ADULT - PROBLEM SELECTOR PLAN 7
VT score 2   cw lovenox 40 mg qd f/u Dementia work up   - f/u b12 , folate, RPR   - CTH   -  for safe disharge

## 2020-07-01 NOTE — ED PROVIDER NOTE - OBJECTIVE STATEMENT
Patient reports he fell today, slipped. landed on buttocks. No LOC, head injury, cp, sob, ap, n/v, focal weakness, paresthesias. Walks with a walker at baseline. c/o pain in buttocks.

## 2020-07-01 NOTE — ED ADULT NURSE NOTE - NS ED NURSE PRESS ULCER STAGE 11
90 Ventricular Rate BPM  89 Atrial Rate BPM  155 P-R Interval ms  77 QRS Duration ms  363 Q-T Interval ms  444 QTC Calculation(Bazett) ms  91 P Axis degrees  75 R Axis degrees  10 T Axis degrees  Sinus rhythm  Confirmed by Johnson Beltran ([id number]) on 1/6/2020 12:20:03 PM  
stage I

## 2020-07-01 NOTE — ED ADULT NURSE NOTE - OBJECTIVE STATEMENT
s/p fall at home, denies LOC. Has a right elbow laceration and s/p fall at home, denies LOC. Has a right elbow laceration and Lt arm ecchymosis. Pt ambulates with a walker. Appear pale and a forgetful. s/p fall at home, denies LOC. Has a bilateral elbow laceration and Lt arm ecchymosis. Stage I sacral ulcer with 2 stage II ulcers on bilateral buttock cheeks. Pt ambulates with a walker at home. Appear pale and a forgetful.

## 2020-07-01 NOTE — ED PROVIDER NOTE - SKIN, MLM
Skin normal color for race, warm, dry. Buttocks - 6x6cm stage I pressure ulcer with two 1x1cm stage II pressure ulcers

## 2020-07-01 NOTE — H&P ADULT - HISTORY OF PRESENT ILLNESS
89 y M PMH of 89 y M from home , lives with daughter , ambulates with walker PMH of severe OA, CLL, sacral pressure ulcer,  macular degeneration , right hip fracture (3 years ago) BPH, HTN, anxiety PSH  cataract surgery presented sp mechanical fall , no head trauma , no LOC, no prodromal symptoms or seizure activity , no focal motor sensory deficit. On my evaluation patient is AAOX1-2. History mostly taken from daughter who reported aggravation in his bilateral knee osteoarthritis pain during past few humid climate. Patient buckled his knees upon standing and fell on his buttock. Patient was c/o b/l knee pain. Otherwise ROS was unremarkable    ED course : ECG was significant for new onset Afib with PVC , rate is controlled below 100 89 y M from home , lives with daughter , ambulates with walker PMH of severe OA, CLL, sacral pressure ulcer,  macular degeneration , right hip fracture (3 years ago) BPH, HTN, anxiety PSH  cataract surgery presented sp mechanical fall , no head trauma , no LOC, no prodromal symptoms or seizure activity , no focal motor sensory deficit. On my evaluation patient is AAOX1-2. History mostly taken from daughter who reported aggravation in his bilateral knee osteoarthritis pain during past few humid climate. Patient buckled his knees upon standing and fell on his buttock. Patient was c/o b/l knee pain. Otherwise ROS was unremarkable    ED course : ECG was significant for new onset Afib with PVC , rate is controlled below 100   GOC: DNR/DNI

## 2020-07-01 NOTE — H&P ADULT - NSHPPHYSICALEXAM_GEN_ALL_CORE
CONSTITUTIONAL: Well appearing, well nourished, awake, alert and in no apparent distress  ENMT: Airway patent, Nasal mucosa clear. Mouth with normal mucosa. Throat has no vesicles, no oropharyngeal exudates and uvula is midline.  EYES: Clear bilaterally, pupils equal, round and reactive to light. EOMI.  CARDIAC: Normal rate, regular rhythm.  Heart sounds S1, S2.  No murmurs, rubs or gallops   RESPIRATORY: Breath sounds clear and equal bilaterally. No wheezes, rhales or rhonchi  MUSCULOSKELETAL: Spine appears normal, range of motion is not limited, no muscle or joint tenderness  EXTREMITIES:Ext 3+ edema, varus deformity of both ankles  NEUROLOGICAL: Alert and oriented, no focal deficits, no motor or sensory deficits.  SKIN: No rash, skin turgor   ABD: Soft NT

## 2020-07-01 NOTE — H&P ADULT - ATTENDING COMMENTS
Patient was examined in the ED and discussed with Dr. Quinones.     He presented after a fall.   He is alert, confused.  Vital Signs Last 24 Hrs  T(C): 36.3 (2020 19:07), Max: 36.7 (2020 14:43)  T(F): 97.4 (2020 19:07), Max: 98 (2020 14:43)  HR: 82 (2020 19:07) (76 - 99)  BP: 152/78 (:07) (125/68 - 155/67)  BP(mean): --  RR: 17 (2020 19:07) (16 - 17)  SpO2: 99% (:07) (98% - 99%)  Lungs, clear  Cor, irreg  Abdomen, soft  Ext 3+ edema, varus deformity of both ankles                          11.8   16.12 )-----------( 296      ( 2020 14:09 )             37.7       07-    140  |  108  |  26<H>  ----------------------------<  96  5.1   |  27  |  0.73    Ca    9.2      2020 14:09                Urinalysis Basic - ( 2020 14:09 )    Color: Yellow / Appearance: Clear / S.010 / pH: x  Gluc: x / Ketone: Negative  / Bili: Negative / Urobili: Negative   Blood: x / Protein: Negative / Nitrite: Negative   Leuk Esterase: Negative / RBC: x / WBC x   Sq Epi: x / Non Sq Epi: x / Bacteria: x        EKG, afib, controlled rate.    IMP:  Multiple falls, ataxia, deformed joints.  Patient is also confused and has short-term memory deficit.           afib, not previously known, controlled rate.  No current evidence of emboli.   Plan: tele/troponin/echo/Cardiology, Dr. Arenas          DVT prophylaxis          Dementia w/u          PT consultation          Will need SW evaluation for safe discharge.

## 2020-07-01 NOTE — H&P ADULT - PROBLEM SELECTOR PLAN 1
New onset A-fib   - HAS BLED score: 2 moderate risk fr major bleeding   - JEFRY VASC score : 3   - will start ASA   - given history of fall and advanced age will hold off full dose anticoagulation for now  - cx metoprolol 12.5 BID   - Telemetry  - f/u echo   *** cardiology Dr Leung New onset A-fib   - HAS BLED score: 2 moderate risk fr major bleeding   - JEFRY VASC score : 3   - will start ASA   - given history of fall and advanced age will hold off full dose anticoagulation for now  - cx metoprolol 12.5 BID   - Telemetry  - trend troponin  - f/u echo   *** cardiology Dr Arenas

## 2020-07-01 NOTE — ED PROVIDER NOTE - NEUROLOGICAL, MLM
CNII-XII intact. Gait steady. Speech clear. Reflexes 2+/4 in all extremities. Strength 4/5 in all extremities. Normal coordination.

## 2020-07-01 NOTE — ED ADULT NURSE NOTE - NSIMPLEMENTINTERV_GEN_ALL_ED
Implemented All Fall with Harm Risk Interventions:  [place] to call system. Call bell, personal items and telephone within reach. Instruct patient to call for assistance. Room bathroom lighting operational. Non-slip footwear when patient is off stretcher. Physically safe environment: no spills, clutter or unnecessary equipment. Stretcher in lowest position, wheels locked, appropriate side rails in place. Provide visual cue, wrist band, yellow gown, etc. Monitor gait and stability. Monitor for mental status changes and reorient to person, place, and time. Review medications for side effects contributing to fall risk. Reinforce activity limits and safety measures with patient and family. Provide visual clues: red socks.

## 2020-07-01 NOTE — ED ADULT TRIAGE NOTE - CHIEF COMPLAINT QUOTE
s/p fall , as per ems no LOC , right knee buckled and pt landed on his buttocks.   contact number - [facility] [phone]

## 2020-07-01 NOTE — ED ADULT NURSE NOTE - PMH
Anemia due to other cause    History of hypertension    Leukemia, chronic lymphocytic    Macular degeneration of both eyes, unspecified type    Osteoarthritis of multiple joints, unspecified osteoarthritis type

## 2020-07-02 NOTE — PROGRESS NOTE ADULT - SUBJECTIVE AND OBJECTIVE BOX
PGY 1 Note discussed with supervising resident and primary attending    Patient is a 89y old  Male with PMH of severe OA, CLL, sacral pressure ulcer,  macular degeneration , right hip fracture (3 years ago) BPH, HTN, anxiety and PSHx of cataract surgery who was brought by EMS with chief complaint of mechanical fall due to OA of bilateral knees (2020 11:25). He is lives with daughter, and reports that he ambulates with walker at home. He doesn't have memory of the events preceding his fall headache or focal motor sensory deficit. On my evaluation patient is AAOX3, and he denies chest pain, SOB, palpitations, nausea or vomiting. On admission, he was found to have AFib with PVC Rate< 100, and CT head was negative for intracranial pathology. Cardio (Dr. Arenas) was consulted about the need for AC due to his age and multiple comorbidities ([id number]- VASc: 3, HAS-BLED: 2). PT has opined that he is a fall risk, and his daughter agrees that his condition is deteriorating with need for more care. He is going to be discharged to Subacute rehab.     GOC: DNR/DNI (2020 17:55)      INTERVAL HPI/OVERNIGHT EVENTS: no events noted overnight.    MEDICATIONS  (STANDING):  aspirin enteric coated 81 milliGRAM(s) Oral daily  enoxaparin Injectable 40 milliGRAM(s) SubCutaneous daily  lisinopril 5 milliGRAM(s) Oral daily  metoprolol tartrate 12.5 milliGRAM(s) Oral two times a day  mirtazapine 15 milliGRAM(s) Oral at bedtime  tamsulosin 0.4 milliGRAM(s) Oral at bedtime    MEDICATIONS  (PRN):  acetaminophen   Tablet .. 650 milliGRAM(s) Oral every 6 hours PRN Mild Pain (1 - 3)      __________________________________________________  REVIEW OF SYSTEMS:  CONSTITUTIONAL: No fever   EYES: no acute visual disturbances  NECK: No pain or stiffness  RESPIRATORY: No cough; No shortness of breath  CARDIOVASCULAR: No chest pain, no palpitations  GASTROINTESTINAL: No pain. No nausea or vomiting; No diarrhea   NEUROLOGICAL: No headache or numbness, no tremors  MUSCULOSKELETAL: No joint pain, no muscle pain  GENITOURINARY: no dysuria, no frequency, no hesitancy  PSYCHIATRY: no depression , no anxiety  ALL OTHER ROS negative        Vital Signs Last 24 Hrs  T(C): 36.8 (2020 11:30), Max: 37 (2020 04:30)  T(F): 98.2 (2020 11:30), Max: 98.6 (2020 04:30)  HR: 67 (2020 11:30) (65 - 82)  BP: 118/64 (2020 11:30) (106/39 - 152/78)  BP(mean): --  RR: 18 (2020 11:30) (17 - 18)  SpO2: 100% (2020 11:30) (97% - 100%)    ________________________________________________  PHYSICAL EXAM:  GENERAL: NAD  HEENT: Normocephalic;  conjunctivae and sclerae clear; moist mucous membranes;   NECK : supple  CHEST/LUNG: Clear to auscultation bilaterally with good air entry   HEART: S1 S2  regular; no murmurs, gallops or rubs  ABDOMEN: Soft, Nontender, Nondistended; Bowel sounds present  EXTREMITIES: no cyanosis; no edema; no calf tenderness  SKIN: warm and dry; no rash  NERVOUS SYSTEM:  Awake and alert; Oriented  to place, person and time ; no new deficits    _________________________________________________  LABS:                        12.1   11.17 )-----------( 200      ( 2020 07:01 )             37.9     07-02    143  |  109<H>  |  17  ----------------------------<  100<H>  4.0   |  28  |  0.70    Ca    9.1      2020 07:01  Phos  3.2     07-02  Mg     2.5     07-02    TPro  7.0  /  Alb  3.7  /  TBili  0.4  /  DBili  x   /  AST  19  /  ALT  22  /  AlkPhos  90  07-    PT/INR - ( 2020 07:01 )   PT: 12.3 sec;   INR: 1.05 ratio         PTT - ( 2020 07:01 )  PTT:30.5 sec  Urinalysis Basic - ( 2020 14:09 )    Color: Yellow / Appearance: Clear / S.010 / pH: x  Gluc: x / Ketone: Negative  / Bili: Negative / Urobili: Negative   Blood: x / Protein: Negative / Nitrite: Negative   Leuk Esterase: Negative / RBC: x / WBC x   Sq Epi: x / Non Sq Epi: x / Bacteria: x      CAPILLARY BLOOD GLUCOSE            RADIOLOGY & ADDITIONAL TESTS:  < from: CT Head No Cont (20 @ 14:35) >  MPRESSION: No acute intracranial hemorrhage, mass effect, or shift of the midline structures.    Mild chronic white matter microvascular type changes.  < end of copied text >    < from: Xray Knee 3 Views, Right (20 @ 14:27) >  FINDINGS: There is severe osteoarthritic narrowing of the medial lateral joint compartments with a lateral tibial femoral condyle spurring.  No fracture or subluxation. No joint effusion.   IMPRESSION:  Severe degenerative narrowing of medial lateral joint compartments. Diffuse osteopenia.  < end of copied text >          < from: Xray Chest 1 View- PORTABLE-Urgent (20 @ 17:42) >  Impression: No active infiltrates    < end of copied text >      Imaging Personally Reviewed:  YES    Consultant(s) Notes Reviewed:   YES    Care Discussed with Consultants : YES     Plan of care was discussed with patient and /or primary care giver; all questions and concerns were addressed and care was aligned with patient's wishes.    Tyler Flynn  [phone]

## 2020-07-02 NOTE — PROGRESS NOTE ADULT - PROBLEM SELECTOR PLAN 3
Pain management with Tylenol for mild pain and tramadol low dose for moderate pain   - PT recommends that he is a fall risk and is thus going to a subacute rehab.  - He reports only occasional intake of tylenol for knee pain; no previous steroid injections.  X-ray of R knee demonstrates severe signs of OA, narrowing, and ostepenia

## 2020-07-02 NOTE — PROGRESS NOTE ADULT - PROBLEM SELECTOR PLAN 2
patient p/w mechanical fall 2/2 severe OA  Xray pelvis negative for acute fractures  CT head negative for intracranial pathology

## 2020-07-02 NOTE — PROGRESS NOTE ADULT - ATTENDING COMMENTS
MEDICAL ATTENDING NOTE    Patient is a 89y old  Male who presents with a chief complaint of S/p Fall (2020 16:06)      INTERVAL HPI/OVERNIGHT EVENTS: offers no new complaints today    MEDICATIONS  (STANDING):  aspirin enteric coated 81 milliGRAM(s) Oral daily  lisinopril 5 milliGRAM(s) Oral daily  mirtazapine 15 milliGRAM(s) Oral at bedtime  tamsulosin 0.4 milliGRAM(s) Oral at bedtime    MEDICATIONS  (PRN):  acetaminophen   Tablet .. 650 milliGRAM(s) Oral every 6 hours PRN Mild Pain (1 - 3)      __________________________________________________  ----------------------------------------------------------------------------------  REVIEW OF SYSTEMS: negative      Vital Signs Last 24 Hrs  T(C): 36.7 (2020 15:50), Max: 37 (2020 04:30)  T(F): 98.1 (2020 15:50), Max: 98.6 (2020 04:30)  HR: 66 (2020 15:50) (65 - 82)  BP: 148/66 (2020 15:50) (106/39 - 152/78)  BP(mean): --  RR: 18 (2020 15:50) (17 - 18)  SpO2: 93% (2020 15:50) (93% - 100%)    _________________  PHYSICAL EXAM:  ---------------------------   NAD; Normocephalic;   LUNGS - no wheezing  HEART: S1 S2+   ABDOMEN: Soft, Nontender, non distended  EXTREMITIES: no cyanosis; no edema  NERVOUS SYSTEM:  Awake and alert; no focal neuro deficits appreciated    _________________________________________________  LABS:                        12.1   11.17 )-----------( 200      ( 2020 07:01 )             37.9     07-02    143  |  109<H>  |  17  ----------------------------<  100<H>  4.0   |  28  |  0.70    Ca    9.1      2020 07:01  Phos  3.2     07-02  Mg     2.5     07-02    TPro  7.0  /  Alb  3.7  /  TBili  0.4  /  DBili  x   /  AST  19  /  ALT  22  /  AlkPhos  90  07-02    PT/INR - ( 2020 07:01 )   PT: 12.3 sec;   INR: 1.05 ratio         PTT - ( 2020 07:01 )  PTT:30.5 sec  Urinalysis Basic - ( 2020 14:09 )    Color: Yellow / Appearance: Clear / S.010 / pH: x  Gluc: x / Ketone: Negative  / Bili: Negative / Urobili: Negative   Blood: x / Protein: Negative / Nitrite: Negative   Leuk Esterase: Negative / RBC: x / WBC x   Sq Epi: x / Non Sq Epi: x / Bacteria: x      CAPILLARY BLOOD GLUCOSE      Pt is seen and evaluated by bedside. Has baseline dementia; but able to communicate.   - High Amadou vasc score, however due to hx of multiple falls and as per cardio recommendations will not start anticoagulation for new onset Afib .   - Start Vitamin D supplementation (600 IU) and optimize pain control for OA for fall risk reduction.   - PT eval - will benefit from SNF  -DVT ppx.   -Fall precaution.   Plan of care was discussed with patient ; all questions and concerns were addressed and care was aligned with patient's wishes.

## 2020-07-02 NOTE — CONSULT NOTE ADULT - ASSESSMENT
89 yo M with HTN, osteoarthritis who presented after a fall, found to be in Atrial fibrillation.    1. Atrial fibrillation: CHADS2-VASc score is 3 (HTN, age+2), consistent with ~3% annual risk of stroke if off systemic anticoagulation  -Given repeated falls (including one resulting in hip fracture in the past), may be reasonable to hold off anticoagulation at this time. Ideally he would be started on Eliquis 5mg PO BID; can defer this decision to outpatient basis.   -Rate controlled off AV shukri blocking agents (target HR <110 bpm on average)    2. HTN: On lisinopril  -Can hold off metoprolol for now unless HR > 110 bpm on average    3. HLD: Adequate lipid panel    ***Note that this is a preliminary note and any recommendations should NOT be carried out until this note is finalized. *** 89 yo M with HTN, osteoarthritis who presented after a fall, found to be in Atrial fibrillation.    1. Atrial fibrillation: CHADS2-VASc score is 3 (HTN, age+2), consistent with ~3% annual risk of stroke if off systemic anticoagulation  -Given repeated falls (including one resulting in hip fracture in the past) as well as dysequilibrium, may be reasonable to hold off anticoagulation at this time. Ideally he would be started on Eliquis 5mg PO BID; can defer this decision to outpatient basis.   -Rate controlled (target HR <110 bpm on average)    2. HTN: On lisinopril  -Can hold off metoprolol for now unless HR > 110 bpm on average    3. HLD: Adequate lipid panel

## 2020-07-02 NOTE — PHYSICAL THERAPY INITIAL EVALUATION ADULT - AMBULATION SKILLS, REHAB EVAL
Subjective   Ramila Souza is a 25 y.o. female   Chief Complaint   Patient presents with   • Annual Exam     last pap: 4.12.17 NILM         History of Present Illness  Ramila Souza is a 25 y.o.  who presents for an annual examination.   Ran out of sumatriptan (used to see MD in [place] for rx for migraines, no aura). Requests refill    Pap history:  Last pap: 2017 NIL  Prior abnormal paps: no   STDs  Sexually active: yes  History of STDs: no  Has had HPV vaccine: yes  Contraception:  OCP - no history of migraine with aura    History of physical abuse: no, History of sexual abuse: no and History of verbal/emotional abuse: no    Screening for BRCA-   Is patient's family history significant for any of the following?   no  For non-[employer] women:   Two first-degree relatives with breast cancer, one of whom was diagnosed at age 50 or younger   A combination of three or more first or second-degree relatives with breast cancer regardless of age at diagnosis   A combination of both breast and ovarian cancer among first and second-degree relatives   A first-degree relative with bilateral breast cancer   A combination of two or more first or second degree relatives with ovarian cancer, regardless of age at diagnosis   A first or second-degree relative with both breast and ovarian cancer at any age   History of breast cancer in a male relative   For women of [employer] descent:   Any first-degree relative (or two second degree relatives on the same side of the family) with breast or ovarian cancer   Recommendations from the [place] Preventive Services Task Force on who should be offered genetic testing for BRCA mutations*     History reviewed. No pertinent past medical history.  Past Surgical History:   Procedure Laterality Date   • HERNIA REPAIR     • TEAR DUCT SURGERY     • WISDOM TOOTH EXTRACTION       Social History     Tobacco Use   • Smoking status: Never Smoker   • Smokeless tobacco:  "Never Used   Substance Use Topics   • Alcohol use: Yes     Comment: social   • Drug use: No     Family History   Problem Relation Age of Onset   • Endocrine tumor Mother    • Glaucoma Paternal Grandmother    • Breast cancer Neg Hx    • Ovarian cancer Neg Hx    • Uterine cancer Neg Hx    • Colon cancer Neg Hx    • Deep vein thrombosis Neg Hx    • Pulmonary embolism Neg Hx      Current Outpatient Medications on File Prior to Visit   Medication Sig Dispense Refill   • [DISCONTINUED] drospirenone-ethinyl estradiol (GIANVI) 3-0.02 MG per tablet Take 1 tablet by mouth Daily. 28 tablet 12   • [DISCONTINUED] SUMAtriptan (IMITREX) 50 MG tablet        No current facility-administered medications on file prior to visit.      Allergies   Allergen Reactions   • Augmentin [Amoxicillin-Pot Clavulanate] Rash     body   • Penicillins Rash     body            Review of Systems   Constitutional: Negative for chills, fever and unexpected weight change.   HENT: Negative for congestion, nosebleeds and sore throat.    Eyes: Negative for visual disturbance.   Respiratory: Negative for cough, chest tightness and shortness of breath.    Cardiovascular: Negative for chest pain and palpitations.   Gastrointestinal: Negative for abdominal pain, constipation, diarrhea, nausea and vomiting.   Endocrine: Negative for polyuria.   Genitourinary: Negative for difficulty urinating, dyspareunia, dysuria, frequency, genital sores, hematuria, menstrual problem, pelvic pain, urgency, vaginal bleeding, vaginal discharge and vaginal pain.   Musculoskeletal: Negative for arthralgias and joint swelling.   Skin: Negative for color change and rash.   Neurological: Negative for dizziness, light-headedness and headaches.   Hematological: Does not bruise/bleed easily.   Psychiatric/Behavioral: Negative for dysphoric mood. The patient is not nervous/anxious.        Objective    /60   Pulse 66   Ht 167.6 cm (66\")   Wt 52.2 kg (115 lb)   Breastfeeding? No  "  BMI 18.56 kg/m²    Physical Exam   Constitutional: She is oriented to person, place, and time. She appears well-developed and well-nourished. No distress.   HENT:   Head: Normocephalic and atraumatic.   Neck: No tracheal deviation present. No thyromegaly present.   Cardiovascular: Normal rate, regular rhythm and normal heart sounds. Exam reveals no gallop and no friction rub.   No murmur heard.  Pulmonary/Chest: Effort normal and breath sounds normal. No respiratory distress. She has no wheezes. She has no rales. She exhibits no tenderness. Right breast exhibits no inverted nipple, no mass, no nipple discharge, no skin change and no tenderness. Left breast exhibits no inverted nipple, no mass, no nipple discharge, no skin change and no tenderness.   Abdominal: Soft. She exhibits no distension and no mass. There is no tenderness.   Genitourinary: Uterus normal. No labial fusion. There is no rash, lesion or injury on the right labia. There is no rash, lesion or injury on the left labia. Uterus is not deviated, not enlarged, not fixed and not tender. Cervix exhibits no motion tenderness, no discharge and no friability. Right adnexum displays no mass, no tenderness and no fullness. Left adnexum displays no mass, no tenderness and no fullness. No tenderness or bleeding in the vagina. No vaginal discharge found.   Musculoskeletal: Normal range of motion. She exhibits no edema or deformity.   Lymphadenopathy:     She has no cervical adenopathy.   Neurological: She is alert and oriented to person, place, and time.   Skin: Skin is warm and dry. No rash noted. She is not diaphoretic.   Psychiatric: She has a normal mood and affect. Her behavior is normal. Judgment and thought content normal.     Assessment/Plan   Ramila was seen today for annual exam.    Diagnoses and all orders for this visit:    Well woman exam with routine gynecological exam  -     Chlamydia trachomatis, Neisseria gonorrhoeae, Trichomonas vaginalis, PCR -  Swab, Vagina    Other orders  -     drospirenone-ethinyl estradiol (GIANVI) 3-0.02 MG per tablet; Take 1 tablet by mouth Daily.  -     SUMAtriptan (IMITREX) 50 MG tablet; Take PRN headache    Well woman counseling/screening:    Cervical cancer screening:    Reports no h/o cervical dysplasia   HPV vaccination completed   The patient is due for a pap in 2020.    Screening guidelines discussed with patient  Breast cancer screening:    Clinical breast exam recommended for age 20-39 years every 1-3 years   Mammogram recommended starting age 40,    Breast self awareness encouraged  STD Screening   declines  Contraception :   Desires to continue Gianvi, no contraindications  Family history    does not demonstrate need for genetics referral   Healthy lifestyle counseling:   Patient was counseled on    Body mass index is 18.56 kg/m².    Weight decreased from 6lb last annual   Requests refill to bridge sumatriptan to PCP.  Only takes 2-3 per month                  independent/Uses a RW/needs device

## 2020-07-02 NOTE — PHYSICAL THERAPY INITIAL EVALUATION ADULT - DIAGNOSIS, PT EVAL
Overall decline in functional mobility due to generalized weakness, decreased cognition, balance and endurance.

## 2020-07-02 NOTE — PROGRESS NOTE ADULT - PROBLEM SELECTOR PLAN 1
New onset A-fib with PVR  - HAS BLED score: 2 moderate risk for major bleeding   - JEFRY VASC score : 3   - will start ASA   - given history of fall and advanced age will hold off full dose anticoagulation for now  - cx metoprolol 12.5 BID   - Telemetry continues to show PVR <100 bpm  - Will continue to trend troponin I (slightly borderline) --> 0.015; 0.017  - Awaiting TTE   *** cardiology Dr Arenas New onset A-fib with PVR  - HAS BLED score: 2 moderate risk for major bleeding   - JEFRY VASC score : 3   - will start ASA   TSH is normal   Telemetry continues to show PVR <100 bpm  Will continue to trend troponin I (slightly borderline) --> 0.015; 0.017  We appreciate the consultation from Dr Arenas cardiology   Recommended to d/c metoprolol as rate is continuously <100 bpm  Will defer d/c AC for now and defer decision to OP basis in view of history of fall and advanced age    Awaiting TTE New onset A-fib with PVR  - HAS BLED score: 2 moderate risk for major bleeding   - JEFRY VASC score : 3   - will start ASA   TSH is normal   Telemetry continues to show PVR <100 bpm  Will continue to trend troponin I (slightly borderline) --> 0.015; 0.017  We appreciate the consultation from Dr Arenas cardiology   Recommended to d/c metoprolol as rate is continuously <100 bpm  Will d/c Anticoagulation for now and defer decision to OP basis in view of history of fall and advanced age    Awaiting TTE

## 2020-07-02 NOTE — CONSULT NOTE ADULT - SUBJECTIVE AND OBJECTIVE BOX
CHIEF COMPLAINT: Fall    HPI: 89 yo M with HTN, osteoarthritis who presented after a fall. Patient reportedly fell when his legs buckled under him. There was no trauma, no LOC. Patient unable to provide any additional history, which was obtained from chart review as well as discussion with care providers. In the ER, he was noted to be in A fib (rate controlled).       PAST MEDICAL & SURGICAL HISTORY:  As above, also Macular degeneration of both eyes, CLL, Anemia, Osteoarthritis, hypertension, Broken internal right hip prosthesis, subsequent encounter    Allergies    No Known Allergies    MEDICATIONS  (STANDING):  aspirin enteric coated 81 milliGRAM(s) Oral daily  enoxaparin Injectable 40 milliGRAM(s) SubCutaneous daily  lisinopril 5 milliGRAM(s) Oral daily  metoprolol tartrate 12.5 milliGRAM(s) Oral two times a day  mirtazapine 15 milliGRAM(s) Oral at bedtime  tamsulosin 0.4 milliGRAM(s) Oral at bedtime    MEDICATIONS  (PRN):  acetaminophen   Tablet .. 650 milliGRAM(s) Oral every 6 hours PRN Mild Pain (1 - 3)      FAMILY HISTORY:    No family history of premature coronary artery disease or sudden cardiac death    SOCIAL HISTORY:  Smoking-  Alcohol-  Illicit Drug use-    REVIEW OF SYSTEMS:  Constitutional: [ ] fever, [ ]weight loss,  [ ]fatigue  Eyes: [ ] visual changes  Respiratory: [ ]shortness of breath;  [ ] cough, [ ]wheezing, [ ]chills, [ ]hemoptysis  Cardiovascular: [ ] chest pain, [ ]palpitations, [ ]dizziness,  [ ]leg swelling [ ]syncope  Gastrointestinal: [ ] abdominal pain, [ ]nausea, [ ]vomiting,  [ ]diarrhea   Genitourinary: [ ] dysuria, [ ] hematuria  Neurologic: [ ] headaches [ ] tremors  [ ] weakness [ ] lightheadedness  Skin: [ ] itching, [ ]burning, [ ] rashes  Endocrine: [ ] heat or cold intolerance  Musculoskeletal: [ ] joint pain or swelling; [ ] muscle, back, or extremity pain  Psychiatric: [ ] depression, [ ]anxiety, [ ]mood swings, or [ ]difficulty sleeping  Hematologic: [ ] easy bruising, [ ] bleeding gums       [ x] All others negative	  [ ] Unable to obtain    Vital Signs Last 24 Hrs  T(C): 36.4 (02 Jul 2020 07:34), Max: 37 (02 Jul 2020 04:30)  T(F): 97.5 (02 Jul 2020 07:34), Max: 98.6 (02 Jul 2020 04:30)  HR: 67 (02 Jul 2020 10:01) (65 - 99)  BP: 125/46 (02 Jul 2020 10:01) (106/39 - 152/78)  BP(mean): --  RR: 18 (02 Jul 2020 07:34) (16 - 18)  SpO2: 97% (02 Jul 2020 10:01) (97% - 100%)  I&O's Summary    01 Jul 2020 07:01  -  02 Jul 2020 07:00  --------------------------------------------------------  IN: 50 mL / OUT: 300 mL / NET: -250 mL        PHYSICAL EXAM:  General: No acute distress  HEENT: EOMI, PERRL  Neck: Supple, No JVD  Lungs: Clear to auscultation bilaterally; No rales or wheezing  Heart: Regular rate and rhythm; No murmurs, rubs, or gallops  Abdomen: Nontender, bowel sounds present  Extremities: No clubbing, cyanosis, or edema  Nervous system:  Alert & Oriented X3, no focal deficits  Psychiatric: Normal affect  Skin: No rashes or lesions      LABS:  07-02    143  |  109<H>  |  17  ----------------------------<  100<H>  4.0   |  28  |  0.70    Ca    9.1      02 Jul 2020 07:01  Phos  3.2     07-02  Mg     2.5     07-02    TPro  7.0  /  Alb  3.7  /  TBili  0.4  /  DBili  x   /  AST  19  /  ALT  22  /  AlkPhos  90  07-02    Creatinine Trend: 0.70<--, 0.73<--                        12.1   11.17 )-----------( 200      ( 02 Jul 2020 07:01 )             37.9     PT/INR - ( 02 Jul 2020 07:01 )   PT: 12.3 sec;   INR: 1.05 ratio         PTT - ( 02 Jul 2020 07:01 )  PTT:30.5 sec    Lipid Panel: Cholesterol, Serum 170  Direct LDL 91  HDL Cholesterol, Serum 70  Triglycerides, Serum 44    Cardiac Enzymes: CARDIAC MARKERS ( 02 Jul 2020 07:01 )  0.017 ng/mL / x     / 162 U/L / x     / 3.8 ng/mL  CARDIAC MARKERS ( 01 Jul 2020 14:09 )  <0.015 ng/mL / x     / 185 U/L / x     / 3.3 ng/mL    RADIOLOGY: < from: Xray Chest 1 View- PORTABLE-Urgent (07.01.20 @ 17:42) >  Impression: No active infiltrates    < end of copied text >    < from: Xray Pelvis AP only (07.01.20 @ 12:41) >  Impression:   No acute fracture-subluxation.    < end of copied text >    ECG [my interpretation]: 7/1/2020 @ 15:43: A fib at 82 bpm, left axis, iLBBB, occasional PVCs vs. aberrantly-conducted complexes    TELEMETRY:     ECHO: Pending CHIEF COMPLAINT: Fall    HPI: 89 yo M with HTN, osteoarthritis who presented after a fall. Patient reportedly fell when his legs buckled under him. There was no trauma, no LOC. Patient unable to provide any additional history, which was obtained from chart review as well as discussion with care providers. In the ER, he was noted to be in A fib (rate controlled). Currently denies any chest pain, dyspnea, palpitations, lightheadedness, or syncope. He is A&O x1-2, thinks he is in his daughter's bedroom right now.        PAST MEDICAL & SURGICAL HISTORY:  As above, also Macular degeneration of both eyes, CLL, Anemia, Osteoarthritis, hypertension, Broken internal right hip prosthesis, subsequent encounter    Allergies    No Known Allergies    MEDICATIONS  (STANDING):  aspirin enteric coated 81 milliGRAM(s) Oral daily  enoxaparin Injectable 40 milliGRAM(s) SubCutaneous daily  lisinopril 5 milliGRAM(s) Oral daily  metoprolol tartrate 12.5 milliGRAM(s) Oral two times a day  mirtazapine 15 milliGRAM(s) Oral at bedtime  tamsulosin 0.4 milliGRAM(s) Oral at bedtime    MEDICATIONS  (PRN):  acetaminophen   Tablet .. 650 milliGRAM(s) Oral every 6 hours PRN Mild Pain (1 - 3)      FAMILY HISTORY:    unobtainable  SOCIAL HISTORY:  unobtainable     REVIEW OF SYSTEMS:  Constitutional: [ ] fever, [ ]weight loss,  [ ]fatigue  Eyes: [ ] visual changes  Respiratory: [ ]shortness of breath;  [ ] cough, [ ]wheezing, [ ]chills, [ ]hemoptysis  Cardiovascular: [ ] chest pain, [ ]palpitations, [ ]dizziness,  [ ]leg swelling [ ]syncope  Gastrointestinal: [ ] abdominal pain, [ ]nausea, [ ]vomiting,  [ ]diarrhea   Genitourinary: [ ] dysuria, [ ] hematuria  Neurologic: [ ] headaches [ ] tremors  [ ] weakness [ ] lightheadedness  Skin: [ ] itching, [ ]burning, [ ] rashes  Endocrine: [ ] heat or cold intolerance  Musculoskeletal: [ ] joint pain or swelling; [ ] muscle, back, or extremity pain  Psychiatric: [ ] depression, [ ]anxiety, [ ]mood swings, or [ ]difficulty sleeping  Hematologic: [ ] easy bruising, [ ] bleeding gums       [ ] All others negative	  [x ] Unable to obtain    Vital Signs Last 24 Hrs  T(C): 36.4 (02 Jul 2020 07:34), Max: 37 (02 Jul 2020 04:30)  T(F): 97.5 (02 Jul 2020 07:34), Max: 98.6 (02 Jul 2020 04:30)  HR: 67 (02 Jul 2020 10:01) (65 - 99)  BP: 125/46 (02 Jul 2020 10:01) (106/39 - 152/78)  BP(mean): --  RR: 18 (02 Jul 2020 07:34) (16 - 18)  SpO2: 97% (02 Jul 2020 10:01) (97% - 100%)  I&O's Summary    01 Jul 2020 07:01  -  02 Jul 2020 07:00  --------------------------------------------------------  IN: 50 mL / OUT: 300 mL / NET: -250 mL        PHYSICAL EXAM:  General: No acute distress  HEENT: EOMI, PERRL  Neck: Supple, No JVD  Lungs: Clear to auscultation bilaterally; No rales or wheezing  Heart: Regular rate and rhythm; No murmurs, rubs, or gallops  Abdomen: Nontender, bowel sounds present  Extremities: No clubbing, cyanosis, or edema  Nervous system:  Alert & Oriented X1-2, no gross focal deficits  Psychiatric: Normal affect  Skin: No rashes or lesions      LABS:  07-02    143  |  109<H>  |  17  ----------------------------<  100<H>  4.0   |  28  |  0.70    Ca    9.1      02 Jul 2020 07:01  Phos  3.2     07-02  Mg     2.5     07-02    TPro  7.0  /  Alb  3.7  /  TBili  0.4  /  DBili  x   /  AST  19  /  ALT  22  /  AlkPhos  90  07-02    Creatinine Trend: 0.70<--, 0.73<--                        12.1   11.17 )-----------( 200      ( 02 Jul 2020 07:01 )             37.9     PT/INR - ( 02 Jul 2020 07:01 )   PT: 12.3 sec;   INR: 1.05 ratio         PTT - ( 02 Jul 2020 07:01 )  PTT:30.5 sec    Lipid Panel: Cholesterol, Serum 170  Direct LDL 91  HDL Cholesterol, Serum 70  Triglycerides, Serum 44    Cardiac Enzymes: CARDIAC MARKERS ( 02 Jul 2020 07:01 )  0.017 ng/mL / x     / 162 U/L / x     / 3.8 ng/mL  CARDIAC MARKERS ( 01 Jul 2020 14:09 )  <0.015 ng/mL / x     / 185 U/L / x     / 3.3 ng/mL    RADIOLOGY: < from: Xray Chest 1 View- PORTABLE-Urgent (07.01.20 @ 17:42) >  Impression: No active infiltrates    < end of copied text >    < from: Xray Pelvis AP only (07.01.20 @ 12:41) >  Impression:   No acute fracture-subluxation.    < end of copied text >    ECG [my interpretation]: 7/1/2020 @ 15:43: A fib at 82 bpm, left axis, iLBBB, occasional PVCs vs. aberrantly-conducted complexes    TELEMETRY: A fib in 80s     ECHO: Pending

## 2020-07-02 NOTE — PHYSICAL THERAPY INITIAL EVALUATION ADULT - GENERAL OBSERVATIONS, REHAB EVAL
Pt. found lying supine in NAD; alert and oriented x 1; confused but able to follow single step commands and cooperative during eval.

## 2020-07-02 NOTE — PROGRESS NOTE ADULT - SUBJECTIVE AND OBJECTIVE BOX
89 y M from home , lives with daughter , ambulates with walker PMH of severe OA, CLL, sacral pressure ulcer,  macular degeneration , right hip fracture (3 years ago) BPH, HTN, anxiety PSH  cataract surgery presented sp mechanical fall , no head trauma , no LOC, no prodromal symptoms or seizure activity , no focal motor sensory deficit. On my evaluation patient is AAOX1-2. History mostly taken from daughter who reported aggravation in his bilateral knee osteoarthritis pain during past few humid climate. Patient buckled his knees upon standing and fell on his buttock. Patient was c/o b/l knee pain. Otherwise ROS was unremarkable    ED course : ECG was significant for new onset Afib with PVC , rate is controlled below 100   GOC: DNR/DNI      ROS: negative    Home Medications:   * Patient Currently Takes Medications as of 01-Jul-2020 13:27 documented in Structured Notes  · 	ascorbic acid 500 mg oral tablet: 1 tab(s) orally once a day  · 	Multiple Vitamins oral tablet: 1 tab(s) orally once a day  · 	zinc sulfate 220 mg oral capsule: 1 cap(s) orally   · 	ibuprofen 400 mg oral tablet: 1 tab(s) orally every 4 hours, As needed, Pain 5-10/10  · 	mirtazapine 15 mg oral tablet: 1 tab(s) orally once a day (at bedtime)  · 	zinc (as gluconate) 50 mg oral tablet: 1 tab(s) orally Monday, Wednesday, and Friday  · 	Flomax 0.4 mg oral capsule: 1 cap(s) orally once a day  · 	lisinopril 10 mg oral tablet: 1 tab(s) orally once a day  · 	ferrous sulfate 325 mg (65 mg elemental iron) oral tablet: 1 tab(s) orally once a day  · 	Lasix 20 mg oral tablet: 1 tab(s) orally once a day  · 	Aspercreme with Lidocaine 4% topical film: Apply topically to affected area once a day    Patient History:    Social History:  Social History (marital status, living situation, occupation, tobacco use, alcohol and drug use, and sexual history): Non smoker, No ETOH , no ilicit drugs	          Physical Exam:  NEUROLOGICAL: Alert and oriented, no focal deficits, no motor or sensory deficits. CARDIAC: Normal rate, regular rhythm.  Heart sounds S1, S2.  No murmurs, rubs or gallops   RESPIRATORY: Breath sounds clear and equal bilaterally. No wheezes, rhales or rhonchi  MUSCULOSKELETAL: Spine appears normal, range of motion is not limited, no muscle or joint tenderness  SKIN: No rash, skin turgor    ABD: Soft NT, ND EXTREMITIES: Ext 3+ edema, varus deformity of both ankles.

## 2020-07-02 NOTE — PROGRESS NOTE ADULT - PROBLEM SELECTOR PLAN 2
patient p/w mechanical fall 2/2   has severe OA  - Ct head no sign of intracranial hemorrhage.   - hip and knee Xray does not show any acute fracture.   - optimize pain meds for osteoarthiritis.  - PT eval today - family wants pt in rehab, as recently pt has been having multiple falls, will benefit from rehab.   - Vitamin D supplementation.   - Fall precaution.

## 2020-07-02 NOTE — PROGRESS NOTE ADULT - PROBLEM SELECTOR PLAN 7
Dementia work up   B12 , folate, RPR was negative   - CTH   - Due to progressive needs, will  arrange with  for discharge to [facility]

## 2020-07-02 NOTE — PHYSICAL THERAPY INITIAL EVALUATION ADULT - CRITERIA FOR SKILLED THERAPEUTIC INTERVENTIONS
anticipated discharge recommendation/risk reduction/prevention/impairments found/functional limitations in following categories/therapy frequency/rehab potential/predicted duration of therapy intervention

## 2020-07-02 NOTE — PHYSICAL THERAPY INITIAL EVALUATION ADULT - IMPAIRMENTS FOUND, PT EVAL
muscle strength/gait, locomotion, and balance/cognitive impairment/gross motor/aerobic capacity/endurance/arousal, attention, and cognition

## 2020-07-02 NOTE — PROGRESS NOTE ADULT - PROBLEM SELECTOR PLAN 1
ew onset A-fib   Seen by cardiology given risk of fall score and  JEFRY VASC score of 3   Per cardiology no anticoagulation at this time (considering risk vs. benefit)   - Continue beta blocker.

## 2020-07-03 NOTE — PROGRESS NOTE ADULT - PROBLEM SELECTOR PLAN 5
patient on lisinopril at home   - will resume   - monitor BP Patient on Flomax 0.4mg qh   - will continue  Urine C/S was negative for UTI

## 2020-07-03 NOTE — PROGRESS NOTE ADULT - ATTENDING COMMENTS
MEDICAL ATTENDING NOTE    Patient is a 89y old  Male who presents with a chief complaint of S/p Fall (02 Jul 2020 16:06)      INTERVAL HPI/OVERNIGHT EVENTS: offers no new complaints today    MEDICATIONS  (STANDING):  aspirin enteric coated 81 milliGRAM(s) Oral daily  lisinopril 5 milliGRAM(s) Oral daily  mirtazapine 15 milliGRAM(s) Oral at bedtime  tamsulosin 0.4 milliGRAM(s) Oral at bedtime    MEDICATIONS  (PRN):  acetaminophen   Tablet .. 650 milliGRAM(s) Oral every 6 hours PRN Mild Pain (1 - 3)      __________________________________________________  ----------------------------------------------------------------------------------  REVIEW OF SYSTEMS: negative      Vital Signs Last 24 Hrs  T(C): 36.6 (03 Jul 2020 11:41), Max: 36.7 (02 Jul 2020 15:50)  T(F): 97.8 (03 Jul 2020 11:41), Max: 98.1 (02 Jul 2020 15:50)  HR: 88 (03 Jul 2020 11:41) (66 - 88)  BP: 146/64 (03 Jul 2020 11:41) (100/52 - 148/66)  BP(mean): --  RR: 18 (03 Jul 2020 11:41) (18 - 19)  SpO2: 97% (03 Jul 2020 11:41) (93% - 98%)    _________________  PHYSICAL EXAM:  ---------------------------   NAD; Normocephalic;   LUNGS - no wheezing  HEART: S1 S2+   ABDOMEN: Soft, Nontender, non distended  EXTREMITIES: no cyanosis; no edema  NERVOUS SYSTEM:  Awake and alert; no focal neuro deficits appreciated                          13.4   16.76 )-----------( 287      ( 03 Jul 2020 10:26 )             41.0   07-03    143  |  107  |  17  ----------------------------<  109<H>  4.0   |  27  |  0.70    Ca    9.4      03 Jul 2020 10:26  Phos  3.2     07-02  Mg     2.5     07-02    TPro  7.5  /  Alb  4.0  /  TBili  0.6  /  DBili  x   /  AST  28  /  ALT  24  /  AlkPhos  97  07-03      CAPILLARY BLOOD GLUCOSE      Pt is seen and evaluated by bedside. Has baseline dementia; but able to communicate.   - High Amadou vasc score, however due to hx of multiple falls and as per cardio recommendations will not start anticoagulation for new onset Afib .   - Vitamin D supplementation (600 IU) and optimize pain control for OA for fall risk reduction.    - PT eval - recommends pt for CIRO - Pt stable for dc from medical stand point to CIRO - awaiting authorization  -DVT ppx.   -Fall precaution.   Plan of care was discussed with patient ; all questions and concerns were addressed and care was aligned with patient's wishes.      I was physically present for the key portions of the evaluation and management (E/M) service provided.  I agree with the above history, physical, and plan which I have reviewed and edited where appropriate.     40 minutes spent on total encounter; more than 50% of the visit was spent counseling and/or coordinating care by the attending physician. MEDICAL ATTENDING NOTE    Patient is a 89y old  Male who presents with a chief complaint of S/p Fall (02 Jul 2020 16:06)      INTERVAL HPI/OVERNIGHT EVENTS: offers no new complaints today    MEDICATIONS  (STANDING):  aspirin enteric coated 81 milliGRAM(s) Oral daily  lisinopril 5 milliGRAM(s) Oral daily  mirtazapine 15 milliGRAM(s) Oral at bedtime  tamsulosin 0.4 milliGRAM(s) Oral at bedtime    MEDICATIONS  (PRN):  acetaminophen   Tablet .. 650 milliGRAM(s) Oral every 6 hours PRN Mild Pain (1 - 3)      __________________________________________________  ----------------------------------------------------------------------------------  REVIEW OF SYSTEMS: negative      Vital Signs Last 24 Hrs  T(C): 36.6 (03 Jul 2020 11:41), Max: 36.7 (02 Jul 2020 15:50)  T(F): 97.8 (03 Jul 2020 11:41), Max: 98.1 (02 Jul 2020 15:50)  HR: 88 (03 Jul 2020 11:41) (66 - 88)  BP: 146/64 (03 Jul 2020 11:41) (100/52 - 148/66)  BP(mean): --  RR: 18 (03 Jul 2020 11:41) (18 - 19)  SpO2: 97% (03 Jul 2020 11:41) (93% - 98%)    _________________  PHYSICAL EXAM:  ---------------------------   NAD; Normocephalic;   LUNGS - no wheezing  HEART: S1 S2+   ABDOMEN: Soft, Nontender, non distended  EXTREMITIES: no cyanosis; edema + bLE  NERVOUS SYSTEM:  Awake and alert; no focal neuro deficits appreciated                          13.4   16.76 )-----------( 287      ( 03 Jul 2020 10:26 )             41.0   07-03    143  |  107  |  17  ----------------------------<  109<H>  4.0   |  27  |  0.70    Ca    9.4      03 Jul 2020 10:26  Phos  3.2     07-02  Mg     2.5     07-02    TPro  7.5  /  Alb  4.0  /  TBili  0.6  /  DBili  x   /  AST  28  /  ALT  24  /  AlkPhos  97  07-03      CAPILLARY BLOOD GLUCOSE      Pt is seen and evaluated by bedside. Has baseline dementia; but able to communicate.   - Pt has new productive cough w/ elevated wbc - suspecting pneumonia vs. Aspiration pneumonia - Obtain sputum cultures -  Start Zosyn, Speech and Swallow - Keep NPO until cleared. Aspiration Precaution.    -High Amadou vasc score, however due to hx of multiple falls and as per cardio recommendations will not start anticoagulation for new onset Afib .   - Vitamin D supplementation (600 IU) and optimize pain control for OA for fall risk reduction.    - PT eval - recommends pt for CIRO - Pt stable for dc from medical stand point to CIRO - awaiting authorization  -DVT ppx.   -Fall precaution.   Plan of care was discussed with patient ; all questions and concerns were addressed and care was aligned with patient's wishes.      I was physically present for the key portions of the evaluation and management (E/M) service provided.  I agree with the above history, physical, and plan which I have reviewed and edited where appropriate.     40 minutes spent on total encounter; more than 50% of the visit was spent counseling and/or coordinating care by the attending physician. MEDICAL ATTENDING NOTE    Patient is a 89y old  Male who presents with a chief complaint of S/p Fall (02 Jul 2020 16:06)      INTERVAL HPI/OVERNIGHT EVENTS: offers no new complaints today    MEDICATIONS  (STANDING):  aspirin enteric coated 81 milliGRAM(s) Oral daily  lisinopril 5 milliGRAM(s) Oral daily  mirtazapine 15 milliGRAM(s) Oral at bedtime  tamsulosin 0.4 milliGRAM(s) Oral at bedtime    MEDICATIONS  (PRN):  acetaminophen   Tablet .. 650 milliGRAM(s) Oral every 6 hours PRN Mild Pain (1 - 3)      __________________________________________________  ----------------------------------------------------------------------------------  REVIEW OF SYSTEMS: negative      Vital Signs Last 24 Hrs  T(C): 36.6 (03 Jul 2020 11:41), Max: 36.7 (02 Jul 2020 15:50)  T(F): 97.8 (03 Jul 2020 11:41), Max: 98.1 (02 Jul 2020 15:50)  HR: 88 (03 Jul 2020 11:41) (66 - 88)  BP: 146/64 (03 Jul 2020 11:41) (100/52 - 148/66)  BP(mean): --  RR: 18 (03 Jul 2020 11:41) (18 - 19)  SpO2: 97% (03 Jul 2020 11:41) (93% - 98%)    _________________  PHYSICAL EXAM:  ---------------------------   NAD; Normocephalic;   LUNGS - no wheezing  HEART: S1 S2+   ABDOMEN: Soft, Nontender, non distended  EXTREMITIES: no cyanosis; edema + bLE  NERVOUS SYSTEM:  Awake and alert; no focal neuro deficits appreciated                          13.4   16.76 )-----------( 287      ( 03 Jul 2020 10:26 )             41.0   07-03    143  |  107  |  17  ----------------------------<  109<H>  4.0   |  27  |  0.70    Ca    9.4      03 Jul 2020 10:26  Phos  3.2     07-02  Mg     2.5     07-02    TPro  7.5  /  Alb  4.0  /  TBili  0.6  /  DBili  x   /  AST  28  /  ALT  24  /  AlkPhos  97  07-03      CAPILLARY BLOOD GLUCOSE      Pt is seen and evaluated by bedside. Has baseline dementia; but able to communicate.   - Pt has new productive cough w/ elevated wbc - suspecting pneumonia vs. Aspiration pneumonia - Obtain sputum cultures -  Start Unasyn, Speech and Swallow - Keep NPO until cleared. Aspiration Precaution.    -High Amadou vasc score, however due to hx of multiple falls and as per cardio recommendations will not start anticoagulation for new onset Afib .   - Vitamin D supplementation (600 IU) and optimize pain control for OA for fall risk reduction.    - PT eval - recommends pt for CIRO - Pt stable for dc from medical stand point to CIRO - awaiting authorization  -DVT ppx.   -Fall precaution.   Plan of care was discussed with patient ; all questions and concerns were addressed and care was aligned with patient's wishes.      I was physically present for the key portions of the evaluation and management (E/M) service provided.  I agree with the above history, physical, and plan which I have reviewed and edited where appropriate.     40 minutes spent on total encounter; more than 50% of the visit was spent counseling and/or coordinating care by the attending physician.

## 2020-07-03 NOTE — PROGRESS NOTE ADULT - PROBLEM SELECTOR PROBLEM 3
Osteoarthritis of multiple joints, unspecified osteoarthritis type Atrial fibrillation, unspecified type

## 2020-07-03 NOTE — CONSULT NOTE ADULT - ASSESSMENT
Welcome Mildred Sparrow <javascript:void(0)>   Update Personal Info  </[email or web address]/comdir/personupdate/?self=T> FAQ  </doh2/applinks/cspnp/FAQ> Search Results Help  </doh2/applinks/cspnp/SearchResultsHelp> Help  </doh2/applinks/cspnp/Help>  × If you receive an error when searching the  Registry clear your cache and log back in. Using Chrome: Delete your browsing History. Using Internet Explorer: Clear your browsing data.   Patient Search </doh2/applinks/cspnp/singlePatientSearch>   Multi-Patient Search </doh2/applinks/cspnp/multiPatientSearch>   Reports </doh2/applinks/cspnp/reports>   Drug Listing </doh2/applinks/cspnp/drugListing>   Designation </doh2/applinks/cspnp/designations>   My YVONNE Numbers </doh2/applinks/cspnp/myDeaNumbers>  Data Detail Level: Printer-Friendly View Extended View   Confidential Drug Utilization Report  Search Terms: radha Sifuentesvalente, 04/24/1927   Search Date: 07/03/2020 12:39:25 PM   The Drug Utilization Report below displays all of the controlled substance prescriptions, if any, that your patient has filled in the last twelve months. The information displayed on this report is compiled from pharmacy submissions to the Department, and accurately reflects the information as submitted by the pharmacies.  This report was requested by: Mildred Sparrow | Reference #: [id number]   There are no results for the search terms that you entered.

## 2020-07-03 NOTE — PROGRESS NOTE ADULT - PROBLEM SELECTOR PLAN 2
patient p/w mechanical fall 2/2 severe OA  Xray pelvis negative for acute fractures  CT head negative for intracranial pathology Pain management has been consulted for chronic OA pain  - PT recommends that he is a fall risk and is thus going to a subacute rehab.  - He reports only occasional intake of tylenol for knee pain; no previous steroid injections.  X-ray of R knee demonstrates severe signs of OA, narrowing, and ostepenia  Vitamin D 1000U daily has been started

## 2020-07-03 NOTE — PROGRESS NOTE ADULT - PROBLEM SELECTOR PLAN 3
Pain management with Tylenol for mild pain and tramadol low dose for moderate pain   - PT recommends that he is a fall risk and is thus going to a subacute rehab.  - He reports only occasional intake of tylenol for knee pain; no previous steroid injections.  X-ray of R knee demonstrates severe signs of OA, narrowing, and ostepenia Pain management has been consulted for chronic OA pain  - PT recommends that he is a fall risk and is thus going to a subacute rehab.  - He reports only occasional intake of tylenol for knee pain; no previous steroid injections.  X-ray of R knee demonstrates severe signs of OA, narrowing, and ostepenia  Vitamin D 1000U daily has been started New onset A-fib with PVR  - HAS BLED score: 2 moderate risk for major bleeding   - JEFRY VASC score : 3   - will start ASA   TSH is normal   Telemetry continues to show PVR <100 bpm  Will continue to trend troponin I (slightly borderline) --> 0.015; 0.017  We appreciate the consultation from Dr Arenas cardiology   Recommended to d/c metoprolol as rate is continuously <100 bpm  Will d/c Anticoagulation for now and defer decision to OP basis in view of history of fall and advanced age    Awaiting TTE

## 2020-07-03 NOTE — CONSULT NOTE ADULT - PROBLEM SELECTOR RECOMMENDATION 9
Optimize Nonopioid mgt  - acetaminophen 650mg po q 8 atc for 3 days  - lidocaine patch daily to bilateral knees  - if pain persists - can do short term nsaids - 250mg naproxen q 8 hours prn but only if pain is severe  Mild Pain ( Pain Level 1-4)  Non pharmacological - Ice to joint, repositioning extremity, elevation, PT, imagery, relaxation.  If pt requires further intervention, call the ortho team or pain mgt.  - no opioids  - OOB/ PT

## 2020-07-03 NOTE — PROGRESS NOTE ADULT - PROBLEM SELECTOR PLAN 1
New onset A-fib with PVR  - HAS BLED score: 2 moderate risk for major bleeding   - JEFRY VASC score : 3   - will start ASA   TSH is normal   Telemetry continues to show PVR <100 bpm  Will continue to trend troponin I (slightly borderline) --> 0.015; 0.017  We appreciate the consultation from Dr Arenas cardiology   Recommended to d/c metoprolol as rate is continuously <100 bpm  Will d/c Anticoagulation for now and defer decision to OP basis in view of history of fall and advanced age    Awaiting TTE patient p/w mechanical fall 2/2 severe OA  Xray pelvis negative for acute fractures  CT head negative for intracranial pathology

## 2020-07-03 NOTE — PROGRESS NOTE ADULT - PROBLEM SELECTOR PLAN 4
Patient on Mirtazapine 15 mg at home   - will resume and monitor patient given h/o fall patient on lisinopril at home   - will resume   - monitor BP

## 2020-07-03 NOTE — CONSULT NOTE ADULT - SUBJECTIVE AND OBJECTIVE BOX
Source of information: , Chart review, patient  Patient language: English  : n/a    CC:  knee pain    HPI:    89 y M from home , lives with daughter , ambulates with walker PMH of severe OA, CLL, sacral pressure ulcer,  macular degeneration , right hip fracture (3 years ago) BPH, HTN, anxiety PSH  cataract surgery presented sp mechanical fall , no head trauma , no LOC, no prodromal symptoms or seizure activity , no focal motor sensory deficit. On my evaluation patient is AAOX1-2. History mostly taken from daughter who reported aggravation in his bilateral knee osteoarthritis pain during past few humid climate. Patient buckled his knees upon standing and fell on his buttock. Patient was c/o b/l knee pain. Otherwise ROS was unremarkable. ED course : ECG was significant for new onset Afib with PVC , rate is controlled below 100   GOC: DNR/DNI (2020 17:55)    Patient is a 89y old  Male who presents with a chief complaint of Mechanical Fall (2020 11:05).  Pt s/p fall after knee buckled.  Pt in bed, nad.  Pt is alert but not oriented to place or time.  Pt denies pain  at this time.  Ambulates at home with walker.  Lives with daughter.     PAIN SCORE: 0/10      SCALE USED: (1-10 VNRS)    PAST MEDICAL & SURGICAL HISTORY:  Macular degeneration of both eyes, unspecified type  Leukemia, chronic lymphocytic  Anemia due to other cause  Osteoarthritis of multiple joints, unspecified osteoarthritis type  History of hypertension  Broken internal right hip prosthesis, subsequent encounter      FAMILY HISTORY:        SOCIAL HISTORY:  [x ] Denies Smoking, Alcohol, or Drug Use    Allergies    No Known Allergies    Intolerances        MEDICATIONS:    MEDICATIONS  (STANDING):  aspirin enteric coated 81 milliGRAM(s) Oral daily  cholecalciferol 1000 Unit(s) Oral daily  enoxaparin Injectable 40 milliGRAM(s) SubCutaneous daily  lisinopril 5 milliGRAM(s) Oral daily  mirtazapine 15 milliGRAM(s) Oral at bedtime  tamsulosin 0.4 milliGRAM(s) Oral at bedtime    MEDICATIONS  (PRN):  acetaminophen   Tablet .. 650 milliGRAM(s) Oral every 6 hours PRN Mild Pain (1 - 3)  guaiFENesin   Syrup  (Sugar-Free) 100 milliGRAM(s) Oral every 6 hours PRN Cough      Vital Signs Last 24 Hrs  T(C): 36.6 (2020 11:41), Max: 36.7 (2020 15:50)  T(F): 97.8 (2020 11:41), Max: 98.1 (2020 15:50)  HR: 88 (2020 11:41) (66 - 88)  BP: 146/64 (2020 11:41) (100/52 - 148/66)  BP(mean): --  RR: 18 (2020 11:41) (18 - 19)  SpO2: 97% (2020 11:41) (93% - 98%)    LABS:                          13.4   16.76 )-----------( 287      ( 2020 10:26 )             41.0     07-03    143  |  107  |  17  ----------------------------<  109<H>  4.0   |  27  |  0.70    Ca    9.4      2020 10:26  Phos  3.2     07-02  Mg     2.5     07-02    TPro  7.5  /  Alb  4.0  /  TBili  0.6  /  DBili  x   /  AST  28  /  ALT  24  /  AlkPhos  97  07-03    PT/INR - ( 2020 07:01 )   PT: 12.3 sec;   INR: 1.05 ratio         PTT - ( 2020 07:01 )  PTT:30.5 sec  LIVER FUNCTIONS - ( 2020 10:26 )  Alb: 4.0 g/dL / Pro: 7.5 g/dL / ALK PHOS: 97 U/L / ALT: 24 U/L DA / AST: 28 U/L / GGT: x           Urinalysis Basic - ( 2020 14:09 )    Color: Yellow / Appearance: Clear / S.010 / pH: x  Gluc: x / Ketone: Negative  / Bili: Negative / Urobili: Negative   Blood: x / Protein: Negative / Nitrite: Negative   Leuk Esterase: Negative / RBC: x / WBC x   Sq Epi: x / Non Sq Epi: x / Bacteria: x      CAPILLARY BLOOD GLUCOSE      POCT Blood Glucose.: 113 mg/dL (2020 11:48)      REVIEW OF SYSTEMS:  - unable to obtain     PHYSICAL EXAM:  GENERAL:  Alert & Oriented X3, NAD, Good concentration  CHEST/LUNG: Clear to auscultation bilaterally; No rales, rhonchi, wheezing, or rubs  HEART: Regular rate and rhythm; No murmurs, rubs, or gallops  ABDOMEN: Soft, Nontender, Nondistended; Bowel sounds present  : no incontinence   EXTREMITIES:  2+ Peripheral Pulses, No cyanosis, or edema  MUSCULOSKELETAL: + decreased rom - knees,  no knee tenderness.  No   NEUROLOGICAL:  alert not oriented to time or place  SKIN: No rashes or lesions    Radiology:      < from: CT Head No Cont (20 @ 14:35) >  EXAM:  CT BRAIN                            PROCEDURE DATE:  2020          INTERPRETATION:  .    CLINICAL INFORMATION: Status post fall.    TECHNIQUE: Multiple axial CT images of the head were obtained without contrast. Sagittal and coronal reconstructed images were acquired from the source data.    COMPARISON: No prior CT studies of the brain are available for comparison at this institution.    FINDINGS: There is no acute intracranial hemorrhage, mass effect, shift of the midline structures, herniation, extra-axial fluid collection, or hydrocephalus.    Disproportionate bilateral medial temporal lobe atrophy is seen with dilatation of the temporal horns, right worse than left.    There is diffuse cerebral volume loss with prominence of the sulci, fissures, and cisternal spaces which is normal for the patient's age. There is mild deep and periventricular white matter hypoattenuation statistically compatible with microvascular changes given calcific atherosclerotic disease of the intracranial arteries.    The paranasal sinuses and mastoid air cells are clear. The calvarium is intact. There is evidence of right-sided cataract removal. Bilateral senile scleral plaques are noted. Mild bilateral temporalis muscle atrophy is seen bilaterally.    IMPRESSION: No acute intracranial hemorrhage, mass effect, or shift of the midline structures.    Mild chronic white matter microvascular type changes.    < end of copied text >  Drug Screen:  aspirin enteric coated 81 milliGRAM(s) Oral daily  enoxaparin Injectable 40 milliGRAM(s) SubCutaneous daily      Risk factors associated with adverse outcomes related to opioid treatment  [ ]  Concurrent benzodiazepine use  [ ]  History/ Active substance use or alcohol use disorder  [ ] Psychiatric co-morbidity  [ ] Sleep apnea  [ ] COPD  [ ] BMI> 35  [ ] Liver dysfunction  [ ] Renal dysfunction  [ ] CHF  [ ] Smoker  [x ]  Age > 60 years    4AT (Assessment test for delirium & cognitive impairment)  _________________________________________________________  [1] ALERTNESS  This includes patients who may be markedly drowsy (eg. difficult to rouse and/or obviously sleepy  during assessment) or agitated/hyperactive. Observe the patient. If asleep, attempt to wake with  speech or gentle touch on shoulder. Ask the patient to state their name and address to assist rating.  Normal (fully alert, but not agitated, throughout assessment) 0  Mild sleepiness for <10 seconds after waking, then normal 0  Clearly abnormal 4    [2] AMT4  Age, date of birth, place (name of the hospital or building), current year.  No mistakes 0  1 mistake 1  2 or more mistakes/untestable 2    [3] ATTENTION  Ask the patient: “Please tell me the months of the year in backwards order, starting at December.”  To assist initial understanding one prompt of “what is the month before December?” is permitted.  Months of the year backwards Achieves 7 months or more correctly 0  Starts but scores <7 months / refuses to start 1 Untestable (cannot start because unwell, drowsy, inattentive) 2    [4] ACUTE CHANGE OR FLUCTUATING COURSE  Evidence of significant change or fluctuation in: alertness, cognition, other mental function  (eg. paranoia, hallucinations) arising over the last 2 weeks and still evident in last 24hrs  No 0  Yes 4    4 or above: possible delirium +/- cognitive impairment  1-3: possible cognitive impairment  0: delirium or severe cognitive impairment unlikely (but delirium still possible if [4] information incomplete)  4AT SCORE: 4      [ x]  [place]  Reviewed and Copied to Chart. See below.

## 2020-07-03 NOTE — PROGRESS NOTE ADULT - PROBLEM SELECTOR PLAN 8
VT score 2   cw lovenox 40 mg qd  ASA 81mg VT score 2   cw lovenox 40 mg qd  ASA 81mg  Speech and swallow was consulted in view of aspiration risk (productive cough in elderly man)

## 2020-07-03 NOTE — PROGRESS NOTE ADULT - SUBJECTIVE AND OBJECTIVE BOX
PGY 1 Note discussed with supervising resident and primary attending    Patient is a 89y old  Male who presents with a chief complaint of S/p Fall (2020 16:06)      INTERVAL HPI/OVERNIGHT EVENTS: no events noted overnight.    MEDICATIONS  (STANDING):  aspirin enteric coated 81 milliGRAM(s) Oral daily  enoxaparin Injectable 40 milliGRAM(s) SubCutaneous daily  lisinopril 5 milliGRAM(s) Oral daily  mirtazapine 15 milliGRAM(s) Oral at bedtime  tamsulosin 0.4 milliGRAM(s) Oral at bedtime    MEDICATIONS  (PRN):  acetaminophen   Tablet .. 650 milliGRAM(s) Oral every 6 hours PRN Mild Pain (1 - 3)  guaiFENesin   Syrup  (Sugar-Free) 100 milliGRAM(s) Oral every 6 hours PRN Cough      __________________________________________________  REVIEW OF SYSTEMS:  CONSTITUTIONAL: No fever   EYES: no acute visual disturbances  NECK: No pain or stiffness  RESPIRATORY: No cough; No shortness of breath  CARDIOVASCULAR: No chest pain, no palpitations  GASTROINTESTINAL: No pain. No nausea or vomiting; No diarrhea   NEUROLOGICAL: No headache or numbness, no tremors  MUSCULOSKELETAL: No joint pain, no muscle pain  GENITOURINARY: no dysuria, no frequency, no hesitancy  PSYCHIATRY: no depression , no anxiety  ALL OTHER ROS negative        Vital Signs Last 24 Hrs  T(C): 36.6 (2020 11:41), Max: 36.7 (2020 15:50)  T(F): 97.8 (2020 11:41), Max: 98.1 (2020 15:50)  HR: 88 (2020 11:41) (66 - 88)  BP: 146/64 (2020 11:41) (100/52 - 148/66)  BP(mean): --  RR: 18 (2020 11:41) (18 - 19)  SpO2: 97% (2020 11:41) (93% - 98%)    ________________________________________________  PHYSICAL EXAM:  GENERAL: NAD  HEENT: Normocephalic;  conjunctivae and sclerae clear; moist mucous membranes;   NECK : supple  CHEST/LUNG: Clear to auscultation bilaterally with good air entry   HEART: S1 S2  regular; no murmurs, gallops or rubs  ABDOMEN: Soft, Nontender, Nondistended; Bowel sounds present  EXTREMITIES: no cyanosis; no edema; no calf tenderness  SKIN: warm and dry; no rash  NERVOUS SYSTEM:  Awake and alert; Oriented  to place, person and time ; no new deficits    _________________________________________________  LABS:                        13.4   16.76 )-----------( 287      ( 2020 10:26 )             41.0     07-03    143  |  107  |  17  ----------------------------<  109<H>  4.0   |  27  |  0.70    Ca    9.4      2020 10:26  Phos  3.2     07-02  Mg     2.5     07-02    TPro  7.5  /  Alb  4.0  /  TBili  0.6  /  DBili  x   /  AST  28  /  ALT  24  /  AlkPhos  97  07-03    PT/INR - ( 2020 07:01 )   PT: 12.3 sec;   INR: 1.05 ratio         PTT - ( 2020 07:01 )  PTT:30.5 sec  Urinalysis Basic - ( 2020 14:09 )    Color: Yellow / Appearance: Clear / S.010 / pH: x  Gluc: x / Ketone: Negative  / Bili: Negative / Urobili: Negative   Blood: x / Protein: Negative / Nitrite: Negative   Leuk Esterase: Negative / RBC: x / WBC x   Sq Epi: x / Non Sq Epi: x / Bacteria: x      CAPILLARY BLOOD GLUCOSE      POCT Blood Glucose.: 113 mg/dL (2020 11:48)        RADIOLOGY & ADDITIONAL TESTS:    Imaging Personally Reviewed:  YES    Consultant(s) Notes Reviewed:   YES    Care Discussed with Consultants : YES     Plan of care was discussed with patient and /or primary care giver; all questions and concerns were addressed and care was aligned with patient's wishes.    Tyler Flynn  [phone]

## 2020-07-03 NOTE — PROGRESS NOTE ADULT - PROBLEM SELECTOR PLAN 6
Patient on Flomax 0.4mg qh   - will continue  Urine C/S was negative for UTI Patient on Mirtazapine 15 mg at home   - will resume and monitor patient given h/o fall

## 2020-07-03 NOTE — PROGRESS NOTE ADULT - SUBJECTIVE AND OBJECTIVE BOX
PGY 1 Note discussed with supervising resident and primary attending    Patient is a 89y old  Male who presents with a chief complaint of S/p Fall (2020 16:06)      INTERVAL HPI/OVERNIGHT EVENTS: no events noted overnight.    MEDICATIONS  (STANDING):  aspirin enteric coated 81 milliGRAM(s) Oral daily  enoxaparin Injectable 40 milliGRAM(s) SubCutaneous daily  lisinopril 5 milliGRAM(s) Oral daily  mirtazapine 15 milliGRAM(s) Oral at bedtime  tamsulosin 0.4 milliGRAM(s) Oral at bedtime    MEDICATIONS  (PRN):  acetaminophen   Tablet .. 650 milliGRAM(s) Oral every 6 hours PRN Mild Pain (1 - 3)  guaiFENesin   Syrup  (Sugar-Free) 100 milliGRAM(s) Oral every 6 hours PRN Cough      __________________________________________________  REVIEW OF SYSTEMS:  CONSTITUTIONAL: No fever   EYES: no acute visual disturbances  NECK: No pain or stiffness  RESPIRATORY: No cough; No shortness of breath  CARDIOVASCULAR: No chest pain, no palpitations  GASTROINTESTINAL: No pain. No nausea or vomiting; No diarrhea   NEUROLOGICAL: No headache or numbness, no tremors  MUSCULOSKELETAL: No joint pain, no muscle pain  GENITOURINARY: no dysuria, no frequency, no hesitancy  PSYCHIATRY: no depression , no anxiety  ALL OTHER ROS negative        Vital Signs Last 24 Hrs  T(C): 36.3 (2020 07:48), Max: 36.8 (2020 11:30)  T(F): 97.4 (2020 07:48), Max: 98.2 (2020 11:30)  HR: 82 (2020 07:48) (66 - 82)  BP: 137/46 (2020 07:48) (100/52 - 148/66)  BP(mean): --  RR: 18 (2020 07:48) (18 - 19)  SpO2: 97% (2020 07:48) (93% - 100%)    ________________________________________________  PHYSICAL EXAM:  GENERAL: NAD  HEENT: Normocephalic;  conjunctivae and sclerae clear; moist mucous membranes;   NECK : supple  CHEST/LUNG: Clear to auscultation bilaterally with good air entry   HEART: S1 S2  regular; no murmurs, gallops or rubs  ABDOMEN: Soft, Nontender, Nondistended; Bowel sounds present  EXTREMITIES: no cyanosis; no edema; no calf tenderness  SKIN: warm and dry; no rash  NERVOUS SYSTEM:  Awake and alert; Oriented  to place, person and time ; no new deficits    _________________________________________________  LABS:                        13.4   16.76 )-----------( 287      ( 2020 10:26 )             41.0     07-03    143  |  107  |  17  ----------------------------<  109<H>  4.0   |  27  |  0.70    Ca    9.4      2020 10:26  Phos  3.2     07-02  Mg     2.5     07-02    TPro  7.5  /  Alb  4.0  /  TBili  0.6  /  DBili  x   /  AST  28  /  ALT  24  /  AlkPhos  97  07-03    PT/INR - ( 2020 07:01 )   PT: 12.3 sec;   INR: 1.05 ratio         PTT - ( 2020 07:01 )  PTT:30.5 sec  Urinalysis Basic - ( 2020 14:09 )    Color: Yellow / Appearance: Clear / S.010 / pH: x  Gluc: x / Ketone: Negative  / Bili: Negative / Urobili: Negative   Blood: x / Protein: Negative / Nitrite: Negative   Leuk Esterase: Negative / RBC: x / WBC x   Sq Epi: x / Non Sq Epi: x / Bacteria: x      CAPILLARY BLOOD GLUCOSE            RADIOLOGY & ADDITIONAL TESTS:    Imaging Personally Reviewed:  YES    Consultant(s) Notes Reviewed:   YES    Care Discussed with Consultants : YES     Plan of care was discussed with patient and /or primary care giver; all questions and concerns were addressed and care was aligned with patient's wishes.    Tyler Flynn  [phone] PGY-1 Progress Note discussed with attending    PAGER #: [[phone]] TILL 5:00 PM  PLEASE CONTACT ON CALL TEAM:  - On Call Team (Please refer to Aide) FROM 5:00 PM - 8:30PM  - Nightfloat Team FROM 8:30 -7:30 AM    CHIEF COMPLAINT & BRIEF HOSPITAL COURSE:  89 y/o male with PMHx of OA of bilateral knees came with a chief complaint of mechanical fall without reported head trauma or LOC. He is A0x2 (place and time) this morning and reported that he slept well during the night. He had a new onset productive cough and we suctioned him during the interview and encouraged him to cough. He denied chest pain, and fevers but answers were jumbled and confused about other questions.    INTERVAL HPI/OVERNIGHT EVENTS:       REVIEW OF SYSTEMS:  CONSTITUTIONAL: No fever, weight loss, or fatigue  RESPIRATORY: Cough +, wheezing, chills or hemoptysis; No shortness of breath  CARDIOVASCULAR: No chest pain, palpitations, dizziness, or leg swelling  GASTROINTESTINAL: No abdominal pain. No nausea, vomiting, or hematemesis; No diarrhea or constipation. No melena or hematochezia.  GENITOURINARY: No dysuria or hematuria, urinary frequency  NEUROLOGICAL: No headaches, memory loss, loss of strength, numbness, or tremors  SKIN: No itching, burning, rashes, or lesions     Vital Signs Last 24 Hrs  T(C): 36.6 (03 Jul 2020 11:41), Max: 36.7 (02 Jul 2020 15:50)  T(F): 97.8 (03 Jul 2020 11:41), Max: 98.1 (02 Jul 2020 15:50)  HR: 88 (03 Jul 2020 11:41) (66 - 88)  BP: 146/64 (03 Jul 2020 11:41) (100/52 - 148/66)  BP(mean): --  RR: 18 (03 Jul 2020 11:41) (18 - 19)  SpO2: 97% (03 Jul 2020 11:41) (93% - 98%)    PHYSICAL EXAMINATION:  GENERAL: NAD, well built  HEAD:  Atraumatic, Normocephalic  EYES:  conjunctiva and sclera clear  NECK: Supple, No JVD, Normal thyroid  CHEST/LUNG: Clear to auscultation. Clear to percussion bilaterally; No rales, rhonchi, wheezing, or rubs  HEART: Regular rate and rhythm; No murmurs, rubs, or gallops  ABDOMEN: Soft, Nontender, Nondistended; Bowel sounds present  NERVOUS SYSTEM:  Alert & Oriented X3,    EXTREMITIES:  2+ Peripheral Pulses, No clubbing, cyanosis, or edema  SKIN: warm dry                                13.4   16.76 )-----------( 287      ( 03 Jul 2020 10:26 )             41.0     07-03    143  |  107  |  17  ----------------------------<  109<H>  4.0   |  27  |  0.70    Ca    9.4      03 Jul 2020 10:26  Phos  3.2     07-02  Mg     2.5     07-02    TPro  7.5  /  Alb  4.0  /  TBili  0.6  /  DBili  x   /  AST  28  /  ALT  24  /  AlkPhos  97  07-03    LIVER FUNCTIONS - ( 03 Jul 2020 10:26 )  Alb: 4.0 g/dL / Pro: 7.5 g/dL / ALK PHOS: 97 U/L / ALT: 24 U/L DA / AST: 28 U/L / GGT: x           CARDIAC MARKERS ( 02 Jul 2020 07:01 )  0.017 ng/mL / x     / 162 U/L / x     / 3.8 ng/mL  CARDIAC MARKERS ( 01 Jul 2020 14:09 )  <0.015 ng/mL / x     / 185 U/L / x     / 3.3 ng/mL      PT/INR - ( 02 Jul 2020 07:01 )   PT: 12.3 sec;   INR: 1.05 ratio         PTT - ( 02 Jul 2020 07:01 )  PTT:30.5 sec    CAPILLARY BLOOD GLUCOSE      RADIOLOGY & ADDITIONAL TESTS: PGY-1 Progress Note discussed with attending    PAGER #: [[phone]] TILL 5:00 PM  PLEASE CONTACT ON CALL TEAM:  - On Call Team (Please refer to Aide) FROM 5:00 PM - 8:30PM  - Nightfloat Team FROM 8:30 -7:30 AM    CHIEF COMPLAINT & BRIEF HOSPITAL COURSE:  89 y/o male with PMHx of OA of bilateral knees came with a chief complaint of mechanical fall without reported head trauma or LOC. He is A0x2 (place and time) this morning and reported that he slept well during the night. He had a new onset productive cough and we suctioned him during the interview and encouraged him to cough. He denied chest pain, and fevers but answers were jumbled and confused about other questions.    INTERVAL HPI/OVERNIGHT EVENTS:       REVIEW OF SYSTEMS:  CONSTITUTIONAL: No fever, weight loss, or fatigue  RESPIRATORY: Cough +, wheezing, chills or hemoptysis; No shortness of breath  CARDIOVASCULAR: No chest pain, palpitations, dizziness, or leg swelling  GASTROINTESTINAL: No abdominal pain. No nausea, vomiting, or hematemesis; No diarrhea or constipation. No melena or hematochezia.  GENITOURINARY: No dysuria or hematuria, urinary frequency  NEUROLOGICAL: No headaches, memory loss, loss of strength, numbness, or tremors  SKIN: No itching, burning, rashes, or lesions     MEDICATIONS  (STANDING):  aspirin enteric coated 81 milliGRAM(s) Oral daily  cholecalciferol 1000 Unit(s) Oral daily  enoxaparin Injectable 40 milliGRAM(s) SubCutaneous daily  lisinopril 5 milliGRAM(s) Oral daily  mirtazapine 15 milliGRAM(s) Oral at bedtime  tamsulosin 0.4 milliGRAM(s) Oral at bedtime    MEDICATIONS  (PRN):  acetaminophen   Tablet .. 650 milliGRAM(s) Oral every 6 hours PRN Mild Pain (1 - 3)  guaiFENesin   Syrup  (Sugar-Free) 100 milliGRAM(s) Oral every 6 hours PRN Cough      Vital Signs Last 24 Hrs  T(C): 36.6 (03 Jul 2020 11:41), Max: 36.7 (02 Jul 2020 15:50)  T(F): 97.8 (03 Jul 2020 11:41), Max: 98.1 (02 Jul 2020 15:50)  HR: 88 (03 Jul 2020 11:41) (66 - 88)  BP: 146/64 (03 Jul 2020 11:41) (100/52 - 148/66)  BP(mean): --  RR: 18 (03 Jul 2020 11:41) (18 - 19)  SpO2: 97% (03 Jul 2020 11:41) (93% - 98%)    PHYSICAL EXAMINATION:  GENERAL: NAD, well built  HEAD:  Atraumatic, Normocephalic  EYES:  conjunctiva and sclera clear  NECK: Supple, No JVD, Normal thyroid  CHEST/LUNG: Clear to auscultation. Clear to percussion bilaterally; No rales, rhonchi, wheezing, or rubs  HEART: Regular rate and rhythm; No murmurs, rubs, or gallops  ABDOMEN: Soft, Nontender, Nondistended; Bowel sounds present  NERVOUS SYSTEM:  Alert & Oriented X3,    EXTREMITIES:  2+ Peripheral Pulses, No clubbing, cyanosis, or edema  SKIN: warm dry                                13.4   16.76 )-----------( 287      ( 03 Jul 2020 10:26 )             41.0     07-03    143  |  107  |  17  ----------------------------<  109<H>  4.0   |  27  |  0.70    Ca    9.4      03 Jul 2020 10:26  Phos  3.2     07-02  Mg     2.5     07-02    TPro  7.5  /  Alb  4.0  /  TBili  0.6  /  DBili  x   /  AST  28  /  ALT  24  /  AlkPhos  97  07-03    LIVER FUNCTIONS - ( 03 Jul 2020 10:26 )  Alb: 4.0 g/dL / Pro: 7.5 g/dL / ALK PHOS: 97 U/L / ALT: 24 U/L DA / AST: 28 U/L / GGT: x           CARDIAC MARKERS ( 02 Jul 2020 07:01 )  0.017 ng/mL / x     / 162 U/L / x     / 3.8 ng/mL  CARDIAC MARKERS ( 01 Jul 2020 14:09 )  <0.015 ng/mL / x     / 185 U/L / x     / 3.3 ng/mL      PT/INR - ( 02 Jul 2020 07:01 )   PT: 12.3 sec;   INR: 1.05 ratio         PTT - ( 02 Jul 2020 07:01 )  PTT:30.5 sec    CAPILLARY BLOOD GLUCOSE      RADIOLOGY & ADDITIONAL TESTS: PGY-1 Progress Note discussed with attending    PAGER #: [[phone]] TILL 5:00 PM  PLEASE CONTACT ON CALL TEAM:  - On Call Team (Please refer to Aide) FROM 5:00 PM - 8:30PM  - Nightfloat Team FROM 8:30 -7:30 AM    CHIEF COMPLAINT & BRIEF HOSPITAL COURSE:  89 y/o male with PMHx of OA of bilateral knees came with a chief complaint of mechanical fall without reported head trauma or LOC. He is A0x2 (place and time) this morning and reported that he slept well during the night. He had a new onset productive cough and we suctioned him during the interview and encouraged him to cough. He denied chest pain, and fevers but answers were jumbled and confused about other questions.    INTERVAL HPI/OVERNIGHT EVENTS:       REVIEW OF SYSTEMS:  CONSTITUTIONAL: No fever, weight loss, or fatigue  RESPIRATORY: Cough +, wheezing, chills or hemoptysis; No shortness of breath  CARDIOVASCULAR: No chest pain, palpitations, dizziness, or leg swelling  GASTROINTESTINAL: No abdominal pain. No nausea, vomiting, or hematemesis; No diarrhea or constipation. No melena or hematochezia.  GENITOURINARY: No dysuria or hematuria, urinary frequency  NEUROLOGICAL: No headaches, memory loss, loss of strength, numbness, or tremors  SKIN: No itching, burning, rashes, or lesions     MEDICATIONS  (STANDING):  aspirin enteric coated 81 milliGRAM(s) Oral daily  cholecalciferol 1000 Unit(s) Oral daily  enoxaparin Injectable 40 milliGRAM(s) SubCutaneous daily  lisinopril 5 milliGRAM(s) Oral daily  mirtazapine 15 milliGRAM(s) Oral at bedtime  tamsulosin 0.4 milliGRAM(s) Oral at bedtime    MEDICATIONS  (PRN):  acetaminophen   Tablet .. 650 milliGRAM(s) Oral every 6 hours PRN Mild Pain (1 - 3)  guaiFENesin   Syrup  (Sugar-Free) 100 milliGRAM(s) Oral every 6 hours PRN Cough      Vital Signs Last 24 Hrs  T(C): 36.6 (03 Jul 2020 11:41), Max: 36.6 (03 Jul 2020 11:41)  T(F): 97.8 (03 Jul 2020 11:41), Max: 97.8 (03 Jul 2020 11:41)  HR: 88 (03 Jul 2020 11:41) (66 - 88)  BP: 146/64 (03 Jul 2020 11:41) (100/52 - 146/64)  BP(mean): --  RR: 18 (03 Jul 2020 11:41) (18 - 19)  SpO2: 97% (03 Jul 2020 11:41) (93% - 98%)    PHYSICAL EXAMINATION:  GENERAL: NAD, well built  HEAD:  Atraumatic, Normocephalic  EYES:  conjunctiva and sclera clear  NECK: Supple, No JVD, Normal thyroid  CHEST/LUNG: Clear to auscultation. Clear to percussion bilaterally; No rales, rhonchi, wheezing, or rubs  HEART: Regular rate and rhythm; No murmurs, rubs, or gallops  ABDOMEN: Soft, Nontender, Nondistended; Bowel sounds present  NERVOUS SYSTEM:  Alert & Oriented X3,    EXTREMITIES:  2+ Peripheral Pulses, No clubbing, cyanosis, or edema  SKIN: warm dry                                13.4   16.76 )-----------( 287      ( 03 Jul 2020 10:26 )             41.0     07-03    143  |  107  |  17  ----------------------------<  109<H>  4.0   |  27  |  0.70    Ca    9.4      03 Jul 2020 10:26  Phos  3.2     07-02  Mg     2.5     07-02    TPro  7.5  /  Alb  4.0  /  TBili  0.6  /  DBili  x   /  AST  28  /  ALT  24  /  AlkPhos  97  07-03    LIVER FUNCTIONS - ( 03 Jul 2020 10:26 )  Alb: 4.0 g/dL / Pro: 7.5 g/dL / ALK PHOS: 97 U/L / ALT: 24 U/L DA / AST: 28 U/L / GGT: x           CARDIAC MARKERS ( 02 Jul 2020 07:01 )  0.017 ng/mL / x     / 162 U/L / x     / 3.8 ng/mL  CARDIAC MARKERS ( 01 Jul 2020 14:09 )  <0.015 ng/mL / x     / 185 U/L / x     / 3.3 ng/mL      PT/INR - ( 02 Jul 2020 07:01 )   PT: 12.3 sec;   INR: 1.05 ratio         PTT - ( 02 Jul 2020 07:01 )  PTT:30.5 sec    CAPILLARY BLOOD GLUCOSE      RADIOLOGY & ADDITIONAL TESTS:

## 2020-07-03 NOTE — PROGRESS NOTE ADULT - PROBLEM SELECTOR PLAN 7
Dementia work up   B12 , folate, RPR was negative   - CTH   - Due to progressive needs, will  arrange with  for discharge to [facility]

## 2020-07-04 NOTE — PROGRESS NOTE ADULT - PROBLEM SELECTOR PLAN 5
Patient on Flomax 0.4mg qh   - will continue  Urine C/S was negative for UTI New onset A-fib with PVR  - HAS BLED score: 2 moderate risk for major bleeding   - JEFRY VASC score : 3   - will start ASA   TSH is normal   Telemetry continues to show PVR <100 bpm  Will continue to trend troponin I (slightly borderline) --> 0.015; 0.017  We appreciate the consultation from Dr Arenas cardiology   Recommended to d/c metoprolol as rate is continuously <100 bpm  Will d/c Anticoagulation for now and defer decision to OP basis in view of history of fall and advanced age    Awaiting TTE

## 2020-07-04 NOTE — PROGRESS NOTE ADULT - PROBLEM SELECTOR PLAN 1
patient p/w mechanical fall 2/2 severe OA  Xray pelvis negative for acute fractures  CT head negative for intracranial pathology Dementia work up   B12 , folate, RPR was negative   CTH  Ammonia ordered to r/o acute enceph    - Due to progressive needs, will  arrange with  for discharge to [facility]

## 2020-07-04 NOTE — PROGRESS NOTE ADULT - PROBLEM SELECTOR PLAN 4
patient on lisinopril at home   - will resume   - monitor BP -Pain management consult for chronic OA is appreciated  Recommended lidocaine patches with Tylenol daily   He reports only occasional intake of tylenol for knee pain; no previous steroid injections.  - PT recommends that he is a fall risk and is thus going to a subacute rehab.  X-ray of R knee demonstrates severe signs of OA, narrowing, and ostepenia  Vitamin D 1000U daily has been started

## 2020-07-04 NOTE — PROGRESS NOTE ADULT - PROBLEM SELECTOR PLAN 8
VT score 2   cw lovenox 40 mg qd  ASA 81mg  Diet was changed to pureed/ thickened liquids +nectar in view of heightened aspiration risk. He can tolerate this well without coughing.   Speech and swallow was consulted in view of aspiration risk (productive cough in elderly man) Patient on Mirtazapine 15 mg at home   - will resume and monitor patient given h/o fall

## 2020-07-04 NOTE — PROGRESS NOTE ADULT - PROBLEM SELECTOR PLAN 2
-Pain management consult for chronic OA is appreciated  Recommended lidocaine patches with Tylenol daily   He reports only occasional intake of tylenol for knee pain; no previous steroid injections.  - PT recommends that he is a fall risk and is thus going to a subacute rehab.  X-ray of R knee demonstrates severe signs of OA, narrowing, and ostepenia  Vitamin D 1000U daily has been started Patient has dementia and unstable gait.  He does not eat without encouragement. He has done well with a trial of very soft food and thickened liquids.

## 2020-07-04 NOTE — PROGRESS NOTE ADULT - ATTENDING COMMENTS
Patient was examined at the bedside with Dr. Flynn.     He was alert, confused.  Oriented to person, NOT to place.  Vital Signs Last 24 Hrs  T(C): 36.7 (04 Jul 2020 15:56), Max: 36.8 (04 Jul 2020 07:46)  T(F): 98 (04 Jul 2020 15:56), Max: 98.2 (04 Jul 2020 07:46)  HR: 70 (04 Jul 2020 15:56) (63 - 89)  BP: 126/49 (04 Jul 2020 15:56) (86/38 - 136/87)  BP(mean): --  RR: 18 (04 Jul 2020 15:56) (18 - 19)  SpO2: 95% (04 Jul 2020 15:56) (95% - 100%)  Lungs, clear  Cor, irreg  Bladder dullness                        10.9   10.52 )-----------( 235      ( 04 Jul 2020 06:53 )             33.3   07-04    145  |  113<H>  |  21<H>  ----------------------------<  90  3.4<L>   |  25  |  0.64    Ca    8.5      04 Jul 2020 06:53    TPro  7.5  /  Alb  4.0  /  TBili  0.6  /  DBili  x   /  AST  28  /  ALT  24  /  AlkPhos  97  07-03  < from: Xray Chest 1 View- PORTABLE-Routine (07.03.20 @ 22:44) >      The heart is normal in size. The aorta is tortuous and atherosclerotic. There is no mediastinal or hilar mass.     The pulmonary vasculature is normal.     Mild thoracic degenerative changes are present.    IMPRESSION:    No acute infiltrate as above.    < end of copied text >    < from: CT Head No Cont (07.02.20 @ 14:35) >    IMPRESSION: No acute intracranial hemorrhage, mass effect, or shift of the midline structures.    Mild chronic white matter microvascular type changes.    < end of copied text >    IMP: Encephalopathy, acute confusion, dementia, likely exacerbated by hospitalization.  Also, possibly by urinary retention         Urinary retention         afib, new onset.  Anticoagulation is contra-indicated         Fall, weakness, deconditioning.  Vitamin D deficiency         FTT, patient was eager to eat soft food and drink thickened liquids         CLL, stable         Pressure ulcer, will need enhanced nutrition and turning.   Plan: Vargas catheter, U/A.  Will consider alpha blockers, if BP tolerates.           Puree diet, Nectar thick liquids          No anticoagulation          Comfort care

## 2020-07-04 NOTE — PROGRESS NOTE ADULT - SUBJECTIVE AND OBJECTIVE BOX
PGY-1 Progress Note discussed with attending    PAGER #: [[phone]] TILL 5:00 PM  PLEASE CONTACT ON CALL TEAM:  - On Call Team (Please refer to Aide) FROM 5:00 PM - 8:30PM  - Nightfloat Team FROM 8:30 -7:30 AM    CHIEF COMPLAINT & BRIEF HOSPITAL COURSE:  89 y/o male with PMHx of OA, sacral ulcer, Afib with PVR, and HTN was admitted to telemetry after a mechanical fall to rule out syncopal causes. Today he is AOx0, and reports that he is in his apartment, living with his wife. Nursing reported that he did not eat food yesterday, but when we fed him a mechanical soft diet, he was able to tolerate well with good gulps and no coughing. He has a wet productive cough, but its unable to expectorate by himself. Oral cavity was suctioned at the beginning of the interview. He did not endorse chest pain, knee pain, or nausea.       INTERVAL HPI/OVERNIGHT EVENTS:       REVIEW OF SYSTEMS:  CONSTITUTIONAL: No fever, weight loss, or fatigue  RESPIRATORY: Productive cough+ , wheezing, chills or hemoptysis; No shortness of breath  CARDIOVASCULAR: No chest pain, palpitations, dizziness, or leg swelling  GASTROINTESTINAL: No abdominal pain. No nausea, vomiting, or hematemesis; No diarrhea or constipation. No melena or hematochezia.  GENITOURINARY: No dysuria or hematuria, urinary frequency  NEUROLOGICAL: Dementiaaches,  loss of strength, numbness, or tremors  SKIN: No itching, burning, rashes, or lesions     MEDICATIONS  (STANDING):  acetaminophen   Tablet .. 650 milliGRAM(s) Oral every 8 hours  ampicillin/sulbactam  IVPB      ampicillin/sulbactam  IVPB 1.5 Gram(s) IV Intermittent every 6 hours  aspirin enteric coated 81 milliGRAM(s) Oral daily  cholecalciferol 1000 Unit(s) Oral daily  enoxaparin Injectable 40 milliGRAM(s) SubCutaneous daily  lidocaine   Patch 2 Patch Transdermal daily  lisinopril 5 milliGRAM(s) Oral daily  mirtazapine 15 milliGRAM(s) Oral at bedtime  tamsulosin 0.4 milliGRAM(s) Oral at bedtime    MEDICATIONS  (PRN):  guaiFENesin   Syrup  (Sugar-Free) 100 milliGRAM(s) Oral every 6 hours PRN Cough      Vital Signs Last 24 Hrs  T(C): 36.4 (04 Jul 2020 11:05), Max: 36.8 (04 Jul 2020 07:46)  T(F): 97.6 (04 Jul 2020 11:05), Max: 98.2 (04 Jul 2020 07:46)  HR: 89 (04 Jul 2020 11:05) (63 - 94)  BP: 129/57 (04 Jul 2020 11:05) (86/38 - 136/87)  BP(mean): --  RR: 18 (04 Jul 2020 11:05) (18 - 19)  SpO2: 98% (04 Jul 2020 11:05) (95% - 100%)    PHYSICAL EXAMINATION:  GENERAL: NAD, well built  HEAD:  Atraumatic, Normocephalic  EYES:  conjunctiva and sclera clear  NECK: Supple, No JVD, Normal thyroid  CHEST/LUNG: Clear to auscultation. Clear to percussion bilaterally; No rales, rhonchi, wheezing, or rubs  HEART: Regular rate and rhythm; No murmurs, rubs, or gallops  ABDOMEN: Soft, Nontender, Nondistended; Bowel sounds present  NERVOUS SYSTEM:  Alert & Oriented X3,    EXTREMITIES:  2+ Peripheral Pulses, No clubbing, cyanosis, or edema  SKIN: warm dry                          10.9   10.52 )-----------( 235      ( 04 Jul 2020 06:53 )             33.3     07-04    145  |  113<H>  |  21<H>  ----------------------------<  90  3.4<L>   |  25  |  0.64    Ca    8.5      04 Jul 2020 06:53    TPro  7.5  /  Alb  4.0  /  TBili  0.6  /  DBili  x   /  AST  28  /  ALT  24  /  AlkPhos  97  07-03    LIVER FUNCTIONS - ( 03 Jul 2020 10:26 )  Alb: 4.0 g/dL / Pro: 7.5 g/dL / ALK PHOS: 97 U/L / ALT: 24 U/L DA / AST: 28 U/L / GGT: x                   CAPILLARY BLOOD GLUCOSE      RADIOLOGY & ADDITIONAL TESTS: PGY-1 Progress Note discussed with attending    PAGER #: [[phone]] TILL 5:00 PM  PLEASE CONTACT ON CALL TEAM:  - On Call Team (Please refer to Aide) FROM 5:00 PM - 8:30PM  - Nightfloat Team FROM 8:30 -7:30 AM    CHIEF COMPLAINT & BRIEF HOSPITAL COURSE:  89 y/o male with PMHx of OA, sacral ulcer, Afib with PVR, and HTN was admitted to telemetry after a mechanical fall to rule out syncopal causes. Today he is AOx0, and reports that he is in his apartment, living with his wife. Nursing reported that he did not eat food yesterday, but when we fed him a mechanical soft diet, he was able to tolerate well with good gulps and no coughing. He has a wet productive cough, but its unable to expectorate by himself. Oral cavity was suctioned at the beginning of the interview. He did not endorse chest pain, knee pain, or nausea.       INTERVAL HPI/OVERNIGHT EVENTS:       REVIEW OF SYSTEMS:  CONSTITUTIONAL: No fever, weight loss, or fatigue  RESPIRATORY: Productive cough+ , wheezing, chills or hemoptysis; No shortness of breath  CARDIOVASCULAR: No chest pain, palpitations, dizziness, or leg swelling  GASTROINTESTINAL: No abdominal pain. No nausea, vomiting, or hematemesis; No diarrhea or constipation. No melena or hematochezia.  GENITOURINARY: No dysuria or hematuria, urinary frequency  NEUROLOGICAL: Dementia + No headaches,  loss of strength, numbness, or tremors  SKIN: No itching, burning, rashes, or lesions     MEDICATIONS  (STANDING):  acetaminophen   Tablet .. 650 milliGRAM(s) Oral every 8 hours  ampicillin/sulbactam  IVPB      ampicillin/sulbactam  IVPB 1.5 Gram(s) IV Intermittent every 6 hours  aspirin enteric coated 81 milliGRAM(s) Oral daily  cholecalciferol 1000 Unit(s) Oral daily  enoxaparin Injectable 40 milliGRAM(s) SubCutaneous daily  lidocaine   Patch 2 Patch Transdermal daily  lisinopril 5 milliGRAM(s) Oral daily  mirtazapine 15 milliGRAM(s) Oral at bedtime  tamsulosin 0.4 milliGRAM(s) Oral at bedtime    MEDICATIONS  (PRN):  guaiFENesin   Syrup  (Sugar-Free) 100 milliGRAM(s) Oral every 6 hours PRN Cough      Vital Signs Last 24 Hrs  T(C): 36.4 (04 Jul 2020 11:05), Max: 36.8 (04 Jul 2020 07:46)  T(F): 97.6 (04 Jul 2020 11:05), Max: 98.2 (04 Jul 2020 07:46)  HR: 89 (04 Jul 2020 11:05) (63 - 94)  BP: 129/57 (04 Jul 2020 11:05) (86/38 - 136/87)  BP(mean): --  RR: 18 (04 Jul 2020 11:05) (18 - 19)  SpO2: 98% (04 Jul 2020 11:05) (95% - 100%)    PHYSICAL EXAMINATION:  GENERAL: NAD, well built  HEAD:  Atraumatic, Normocephalic  EYES:  conjunctiva and sclera clear  NECK: Supple, No JVD, Normal thyroid  CHEST/LUNG: Rales present on auscultation, most likely conducted bronchial sounds    HEART: Regular rate and rhythm; No murmurs, rubs, or gallops  ABDOMEN: Soft, Nontender, Nondistended; Bowel sounds present  NERVOUS SYSTEM:  Alert & Oriented X0,    EXTREMITIES:  2+ Peripheral Pulses, No clubbing, cyanosis, or edema  SKIN: warm dry                          10.9   10.52 )-----------( 235      ( 04 Jul 2020 06:53 )             33.3     07-04    145  |  113<H>  |  21<H>  ----------------------------<  90  3.4<L>   |  25  |  0.64    Ca    8.5      04 Jul 2020 06:53    TPro  7.5  /  Alb  4.0  /  TBili  0.6  /  DBili  x   /  AST  28  /  ALT  24  /  AlkPhos  97  07-03    LIVER FUNCTIONS - ( 03 Jul 2020 10:26 )  Alb: 4.0 g/dL / Pro: 7.5 g/dL / ALK PHOS: 97 U/L / ALT: 24 U/L DA / AST: 28 U/L / GGT: x                   CAPILLARY BLOOD GLUCOSE      RADIOLOGY & ADDITIONAL TESTS: PGY-1 Progress Note discussed with attending    PAGER #: [[phone]] TILL 5:00 PM  PLEASE CONTACT ON CALL TEAM:  - On Call Team (Please refer to Aide) FROM 5:00 PM - 8:30PM  - Nightfloat Team FROM 8:30 -7:30 AM    CHIEF COMPLAINT & BRIEF HOSPITAL COURSE:  89 y/o male with PMHx of OA, sacral ulcer, Afib with PVR, and HTN was admitted to telemetry after a mechanical fall to rule out syncopal causes. Today he is AOx0, and reports that he is in his apartment, living with his wife. Nursing reported that he did not eat food yesterday, but when we fed him a mechanical soft diet, he was able to tolerate well with good gulps and no coughing. He has a wet productive cough, but its unable to expectorate by himself. Oral cavity was suctioned at the beginning of the interview. He did not endorse chest pain, knee pain, or nausea.       INTERVAL HPI/OVERNIGHT EVENTS:       REVIEW OF SYSTEMS:  CONSTITUTIONAL: No fever, weight loss, or fatigue  RESPIRATORY: Productive cough+ , wheezing, chills or hemoptysis; No shortness of breath  CARDIOVASCULAR: No chest pain, palpitations, dizziness, or leg swelling  GASTROINTESTINAL: No abdominal pain. No nausea, vomiting, or hematemesis; No diarrhea or constipation. No melena or hematochezia.  GENITOURINARY: No dysuria or hematuria, urinary frequency  NEUROLOGICAL: Dementia + No headaches,  loss of strength, numbness, or tremors  SKIN: No itching, burning, rashes, or lesions     MEDICATIONS  (STANDING):  acetaminophen   Tablet .. 650 milliGRAM(s) Oral every 8 hours  ampicillin/sulbactam  IVPB      ampicillin/sulbactam  IVPB 1.5 Gram(s) IV Intermittent every 6 hours  aspirin enteric coated 81 milliGRAM(s) Oral daily  cholecalciferol 1000 Unit(s) Oral daily  enoxaparin Injectable 40 milliGRAM(s) SubCutaneous daily  lidocaine   Patch 2 Patch Transdermal daily  lisinopril 5 milliGRAM(s) Oral daily  mirtazapine 15 milliGRAM(s) Oral at bedtime  tamsulosin 0.4 milliGRAM(s) Oral at bedtime    MEDICATIONS  (PRN):  guaiFENesin   Syrup  (Sugar-Free) 100 milliGRAM(s) Oral every 6 hours PRN Cough      Vital Signs Last 24 Hrs  T(C): 36.4 (04 Jul 2020 11:05), Max: 36.8 (04 Jul 2020 07:46)  T(F): 97.6 (04 Jul 2020 11:05), Max: 98.2 (04 Jul 2020 07:46)  HR: 89 (04 Jul 2020 11:05) (63 - 94)  BP: 129/57 (04 Jul 2020 11:05) (86/38 - 136/87)  BP(mean): --  RR: 18 (04 Jul 2020 11:05) (18 - 19)  SpO2: 98% (04 Jul 2020 11:05) (95% - 100%)    PHYSICAL EXAMINATION:  GENERAL: NAD, chronically ill appearing  HEAD:  Atraumatic, Normocephalic  EYES:  conjunctiva and sclera clear  NECK: Supple, No JVD, Normal thyroid  CHEST/LUNG: Rales present on auscultation, most likely conducted bronchial sounds    HEART: Regular rate and rhythm; No murmurs, rubs, or gallops  ABDOMEN: Soft, Nontender, Nondistended; Bowel sounds present  NERVOUS SYSTEM:  Alert & Oriented X0,    EXTREMITIES:  2+ Peripheral Pulses, No clubbing, cyanosis, or edema  SKIN: warm dry                          10.9   10.52 )-----------( 235      ( 04 Jul 2020 06:53 )             33.3     07-04    145  |  113<H>  |  21<H>  ----------------------------<  90  3.4<L>   |  25  |  0.64    Ca    8.5      04 Jul 2020 06:53    TPro  7.5  /  Alb  4.0  /  TBili  0.6  /  DBili  x   /  AST  28  /  ALT  24  /  AlkPhos  97  07-03    LIVER FUNCTIONS - ( 03 Jul 2020 10:26 )  Alb: 4.0 g/dL / Pro: 7.5 g/dL / ALK PHOS: 97 U/L / ALT: 24 U/L DA / AST: 28 U/L / GGT: x

## 2020-07-04 NOTE — PROGRESS NOTE ADULT - PROBLEM SELECTOR PLAN 7
Dementia work up   B12 , folate, RPR was negative   CTH  Ammonia ordered to r/o acute enceph    - Due to progressive needs, will  arrange with  for discharge to [facility] Patient on Flomax 0.4mg qh   - will continue  Urine C/S was negative for UTI

## 2020-07-05 NOTE — PROGRESS NOTE ADULT - PROBLEM SELECTOR PLAN 1
Dementia work up   B12 , folate, RPR was negative   CTH  Ammonia ordered to r/o acute enceph  Ammonia, Serum: 21 umol/L (07.05.20 @ 06:31)    - Due to progressive needs, will  arrange with  for discharge to [facility]

## 2020-07-05 NOTE — PROGRESS NOTE ADULT - SUBJECTIVE AND OBJECTIVE BOX
MEDICAL ATTENDING NOTE  Patient is a 89y old  Male who presents with a chief complaint of Mechanical Fall (2020 14:06)      HPI:  89 y M from home , lives with daughter , ambulates with walker PMH of severe OA, CLL, sacral pressure ulcer,  macular degeneration , right hip fracture (3 years ago) BPH, HTN, anxiety PSH  cataract surgery presented sp mechanical fall , no head trauma , no LOC, no prodromal symptoms or seizure activity , no focal motor sensory deficit. On my evaluation patient is AAOX1-2. History mostly taken from daughter who reported aggravation in his bilateral knee osteoarthritis pain during past few humid climate. Patient buckled his knees upon standing and fell on his buttock. Patient was c/o b/l knee pain. Otherwise ROS was unremarkable    ED course : ECG was significant for new onset Afib with PVC , rate is controlled below 100   GOC: DNR/DNI (2020 17:55)      INTERVAL HPI/OVERNIGHT EVENTS: Patient had > 900 ml residual urine volume and Vargas was placed.     MEDICATIONS  (STANDING):  acetaminophen   Tablet .. 650 milliGRAM(s) Oral every 8 hours  ampicillin/sulbactam  IVPB      ampicillin/sulbactam  IVPB 1.5 Gram(s) IV Intermittent every 6 hours  aspirin enteric coated 81 milliGRAM(s) Oral daily  cholecalciferol 1000 Unit(s) Oral daily  enoxaparin Injectable 40 milliGRAM(s) SubCutaneous daily  lidocaine   Patch 2 Patch Transdermal daily  lisinopril 5 milliGRAM(s) Oral daily  mirtazapine 15 milliGRAM(s) Oral at bedtime  tamsulosin 0.4 milliGRAM(s) Oral at bedtime    MEDICATIONS  (PRN):  guaiFENesin   Syrup  (Sugar-Free) 100 milliGRAM(s) Oral every 6 hours PRN Cough      __________________________________________________  REVIEW OF SYSTEMS:    CONSTITUTIONAL: No fever,   EYES: no acute visual disturbances  NECK: No pain or stiffness  RESPIRATORY: No cough; No shortness of breath  CARDIOVASCULAR: No chest pain, no palpitations  GASTROINTESTINAL: No pain. No nausea or vomiting; No diarrhea   NEUROLOGICAL: No headache or numbness, no tremors  MUSCULOSKELETAL: No joint pain, no muscle pain  GENITOURINARY: no dysuria, no frequency, no hesitancy  PSYCHIATRY: no depression , no anxiety  ALL OTHER  ROS negative        Vital Signs Last 24 Hrs  T(C): 36.7 (2020 11:09), Max: 36.9 (2020 23:45)  T(F): 98 (2020 11:09), Max: 98.4 (2020 23:45)  HR: 78 (2020 11:09) (67 - 78)  BP: 122/43 (2020 11:09) (104/50 - 131/48)  BP(mean): --  RR: 18 (2020 11:09) (18 - 18)  SpO2: 98% (2020 11:09) (95% - 99%)    ________________________________________________  PHYSICAL EXAM:  GENERAL: NAD  HEENT: Normocephalic;  conjunctivae and sclerae clear; moist mucous membranes;   NECK : supple  CHEST/LUNG: Clear to auscultation bilaterally with good air entry   HEART: S1 S2  regular; no murmurs, gallops or rubs  ABDOMEN: Soft, Nontender, Nondistended; Bowel sounds present  EXTREMITIES: no cyanosis; no edema; no calf tenderness  SKIN: warm and dry; no rash  NERVOUS SYSTEM:  Awake and alert; Oriented  to place, person and time ; no new deficits    _________________________________________________  LABS:                        11.0   11.43 )-----------( 260      ( 2020 06:33 )             35.0     07-05    146<H>  |  111<H>  |  25<H>  ----------------------------<  103<H>  3.7   |  29  |  0.79    Ca    8.6      2020 06:33        Urinalysis Basic - ( 2020 18:14 )    Color: Yellow / Appearance: Clear / S.020 / pH: x  Gluc: x / Ketone: Trace  / Bili: Negative / Urobili: Negative   Blood: x / Protein: 30 mg/dL / Nitrite: Negative   Leuk Esterase: Negative / RBC: 0-2 /HPF / WBC 0-2 /HPF   Sq Epi: x / Non Sq Epi: Few /HPF / Bacteria: Trace /HPF      CAPILLARY BLOOD GLUCOSE

## 2020-07-05 NOTE — PROGRESS NOTE ADULT - PROBLEM SELECTOR PLAN 2
Patient has dementia and unstable gait.  He does not eat without encouragement. He has done well with a trial of very soft food and thickened liquids.

## 2020-07-05 NOTE — PROGRESS NOTE ADULT - PROBLEM SELECTOR PLAN 4
-Pain management consult for chronic OA is appreciated  Recommended lidocaine patches with Tylenol daily   He reports only occasional intake of tylenol for knee pain; no previous steroid injections.  - PT recommends that he is a fall risk and is thus going to a subacute rehab.  X-ray of R knee demonstrates severe signs of OA, narrowing, and ostepenia  Vitamin D 1000U daily has been started

## 2020-07-05 NOTE — PROGRESS NOTE ADULT - PROBLEM SELECTOR PLAN 5
New onset A-fib with PVR  - HAS BLED score: 2 moderate risk for major bleeding   - JEFRY VASC score : 3   - will start ASA   TSH is normal   Telemetry continues to show PVR <100 bpm  Will continue to trend troponin I (slightly borderline) --> 0.015; 0.017  We appreciate the consultation from Dr Arenas cardiology   Recommended to d/c metoprolol as rate is continuously <100 bpm  Will d/c Anticoagulation for now and defer decision to OP basis in view of history of fall and advanced age    Awaiting TTE

## 2020-07-05 NOTE — PROGRESS NOTE ADULT - ATTENDING COMMENTS
Patient was examined at the bedside.    He was alert, confused.  Oriented to person, NOT to place.  Decreased vison  Vital Signs Last 24 Hrs  T(C): 36.7 (05 Jul 2020 11:09), Max: 36.9 (04 Jul 2020 23:45)  T(F): 98 (05 Jul 2020 11:09), Max: 98.4 (04 Jul 2020 23:45)  HR: 78 (05 Jul 2020 11:09) (67 - 78)  BP: 122/43 (05 Jul 2020 11:09) (104/50 - 131/48)  BP(mean): --  RR: 18 (05 Jul 2020 11:09) (18 - 18)  SpO2: 98% (05 Jul 2020 11:09) (95% - 99%)  Lungs, clear  Cor, irreg  Vargas is draining clear urine     < from: Xray Chest 1 View- PORTABLE-Routine (07.03.20 @ 22:44) >      The heart is normal in size. The aorta is tortuous and atherosclerotic. There is no mediastinal or hilar mass.     The pulmonary vasculature is normal.     Mild thoracic degenerative changes are present.    IMPRESSION:    No acute infiltrate as above.    < end of copied text >    < from: CT Head No Cont (07.02.20 @ 14:35) >    IMPRESSION: No acute intracranial hemorrhage, mass effect, or shift of the midline structures.    Mild chronic white matter microvascular type changes.    < end of copied text >    Ammonia, Serum: 21 umol/L (07.05.20 @ 06:31)    IMP: Encephalopathy, acute confusion, dementia, likely exacerbated by hospitalization.  Also, possibly by urinary retention         Urinary retention, relieved with Vargas.  Patient is on alpha blocker.         afib, new onset.  Anticoagulation is contra-indicated         Fall, weakness, deconditioning.  Vitamin D deficiency         FTT, patient was eager to eat soft food and drink thickened liquids         CLL, stable         Pressure ulcer, will need enhanced nutrition and turning.   Plan: Vargas catheter, U/A.            Puree diet, Nectar thick liquids          No anticoagulation          Comfort care.  Patient may need LTC placement.

## 2020-07-06 NOTE — SWALLOW BEDSIDE ASSESSMENT ADULT - ASR SWALLOW ASPIRATION MONITOR
gurgly voice/cough/change of breathing pattern/fever/position upright (89Y)/oral hygiene/pneumonia/throat clearing

## 2020-07-06 NOTE — PROGRESS NOTE ADULT - PROBLEM SELECTOR PLAN 1
Pt with pain in b/l knees which is somatic in nature due to osteoarthritis.   High risk medications reviewed. Avoid polypharmacy. Avoid IV opioids. Avoid NSAIDs and benzodiazepines. Non-pharmacological sleep aides initiated. Non-opioid medications and non-pharmacological pain management measures initiated.   Opioid pain recommendations   - Do not recommend opioids. Maximize non-opioid pain recommendations  Non-opioid pain recommendations   - Acetaminophen 650mg PO q 8 hours PRN moderate pain  - Continue Lidoderm 5% patch 2 patches daily.   Bowel Regimen  - Per primary team  Mild pain   - Non-pharmacological pain treatment recommendations  - Warm/ Cool packs PRN   - Repositioning extremity, elevation, imagery, relaxation, distraction.  - Physical therapy OOB if no contraindications   Recommendations discussed with primary team and RN

## 2020-07-06 NOTE — DIETITIAN INITIAL EVALUATION ADULT. - FACTORS AFF FOOD INTAKE
change in mental status/difficulty chewing/difficulty feeding self/difficulty with food procurement/preparation/other (specify)/acute on chronic comorbidities/difficulty swallowing

## 2020-07-06 NOTE — PROGRESS NOTE ADULT - PROBLEM SELECTOR PLAN 5
New onset A-fib with PVR  - HAS BLED score: 2 moderate risk for major bleeding   - JEFRY VASC score : 3   - will start ASA   TSH is normal   Telemetry continues to show PVR <100 bpm  Will continue to trend troponin I (slightly borderline) --> 0.015; 0.017  We appreciate the consultation from Dr Arenas cardiology   Recommended to d/c metoprolol as rate is continuously <100 bpm  Will d/c Anticoagulation for now and defer decision to OP basis in view of history of fall and advanced age    Awaiting TTE New onset A-fib with PVR  - HAS BLED score: 2 moderate risk for major bleeding   - JEFRY VASC score : 3   - will start ASA   TSH is normal   Telemetry continues to show PVR <100 bpm  Will continue to trend troponin I (slightly borderline) --> 0.015; 0.017  We appreciate the consultation from Dr Arenas cardiology - Recommended to d/c metoprolol as rate is continuously <100 bpm and  d/c Anticoagulation for now and defer decision to OP basis in view of history of fall and advanced age    Awaiting TTE

## 2020-07-06 NOTE — PROGRESS NOTE ADULT - SUBJECTIVE AND OBJECTIVE BOX
Source of information: JARAD HENAO, Chart review  Patient language: English  : n/a    HPI:  89 y M from home , lives with daughter , ambulates with walker PMH of severe OA, CLL, sacral pressure ulcer,  macular degeneration , right hip fracture (3 years ago) BPH, HTN, anxiety PSH  cataract surgery presented sp mechanical fall , no head trauma , no LOC, no prodromal symptoms or seizure activity , no focal motor sensory deficit. On my evaluation patient is AAOX1-2. History mostly taken from daughter who reported aggravation in his bilateral knee osteoarthritis pain during past few humid climate. Patient buckled his knees upon standing and fell on his buttock. Patient was c/o b/l knee pain. Otherwise ROS was unremarkable    ED course : ECG was significant for new onset Afib with PVC , rate is controlled below 100   GOC: DNR/DNI (2020 17:55)      This is a Patient is a 89y old  Male who presents with a chief complaint of fall after knee buckled. Pt seen and examined at bedside. Laying in bed, NAD. Pt denies pain at this time. Pt reports lidocaine patches and warm packs help alleviate his pain. Plan is to be discharged home today. Pt tolerating PO diet. Pt denies lethargy, nausea, vomiting, constipation and itchiness. Patient stated goal for pain control: to be able to get out of bed to chair and turn in bed with tolerable pain control. Ambulates at home with walker.  Lives with daughter.     PAST MEDICAL & SURGICAL HISTORY:  Macular degeneration of both eyes, unspecified type  Leukemia, chronic lymphocytic  Anemia due to other cause  Osteoarthritis of multiple joints, unspecified osteoarthritis type  History of hypertension  Broken internal right hip prosthesis, subsequent encounter    Social History:  Non smoker, No ETOH , no ilicit drugs (2020 17:55)   [X]Denies ETOH use, illicit drug use, and smoking     Allergies    No Known Allergies      MEDICATIONS  (STANDING):  ampicillin/sulbactam  IVPB      ampicillin/sulbactam  IVPB 1.5 Gram(s) IV Intermittent every 6 hours  aspirin enteric coated 81 milliGRAM(s) Oral daily  cholecalciferol 1000 Unit(s) Oral daily  enoxaparin Injectable 40 milliGRAM(s) SubCutaneous daily  lidocaine   Patch 2 Patch Transdermal daily  lisinopril 5 milliGRAM(s) Oral daily  mirtazapine 15 milliGRAM(s) Oral at bedtime  sodium chloride 0.9%. 1000 milliLiter(s) (60 mL/Hr) IV Continuous <Continuous>  tamsulosin 0.4 milliGRAM(s) Oral at bedtime    MEDICATIONS  (PRN):  guaiFENesin   Syrup  (Sugar-Free) 100 milliGRAM(s) Oral every 6 hours PRN Cough      Vital Signs Last 24 Hrs  T(C): 36.3 (2020 11:03), Max: 36.6 (2020 23:36)  T(F): 97.3 (2020 11:03), Max: 97.8 (2020 23:36)  HR: 70 (2020 11:03) (68 - 73)  BP: 111/45 (2020 11:03) (90/77 - 138/66)  BP(mean): --  RR: 18 (2020 11:03) (18 - 18)  SpO2: 98% (2020 11:03) (98% - 99%)  COVID-19 PCR: NotDetec (2020 14:12)    LABS: Reviewed                          11.0   10.45 )-----------( 266      ( 2020 06:03 )             34.8     07-06    146<H>  |  111<H>  |  20<H>  ----------------------------<  81  3.8   |  27  |  0.62    Ca    8.5      2020 06:03          Urinalysis Basic - ( 2020 18:14 )    Color: Yellow / Appearance: Clear / S.020 / pH: x  Gluc: x / Ketone: Trace  / Bili: Negative / Urobili: Negative   Blood: x / Protein: 30 mg/dL / Nitrite: Negative   Leuk Esterase: Negative / RBC: 0-2 /HPF / WBC 0-2 /HPF   Sq Epi: x / Non Sq Epi: Few /HPF / Bacteria: Trace /HPF      CAPILLARY BLOOD GLUCOSE        COVID-19 PCR: NotDetec (2020 14:12)      Radiology: Reviewed  < from: CT Head No Cont (20 @ 14:35) >  EXAM:  CT BRAIN                            PROCEDURE DATE:  2020          INTERPRETATION:  .    CLINICAL INFORMATION: Status post fall.    TECHNIQUE: Multiple axial CT images of the head were obtained without contrast. Sagittal and coronal reconstructed images were acquired from the source data.    COMPARISON: No prior CT studies of the brain are available for comparison at this institution.    FINDINGS: There is no acute intracranial hemorrhage, mass effect, shift of the midline structures, herniation, extra-axial fluid collection, or hydrocephalus.    Disproportionate bilateral medial temporal lobe atrophy is seen with dilatation of the temporal horns, right worse than left.    There is diffuse cerebral volume loss with prominence of the sulci, fissures, and cisternal spaces which is normal for the patient's age. There is mild deep and periventricular white matter hypoattenuation statistically compatible with microvascular changes given calcific atherosclerotic disease of the intracranial arteries.    The paranasal sinuses and mastoid air cells are clear. The calvarium is intact. There is evidence of right-sided cataract removal. Bilateral senile scleral plaques are noted. Mild bilateral temporalis muscle atrophy is seen bilaterally.    IMPRESSION: No acute intracranial hemorrhage, mass effect, or shift of the midline structures.    Mild chronic white matter microvascular type changes.                SONNY PEREZ M.D., ATTENDING RADIOLOGIST  This document has been electronically signed. 2020  2:45PM                < end of copied text >    < from: Xray Chest 1 View- PORTABLE-Routine (20 @ 22:44) >      < end of copied text >      ORT Score -   Family Hx of substance abuse	Female	      Male  Alcohol 	                                           1                     3  Illegal drugs	                                   2                     3  Rx drugs                                           4 	                  4  Personal Hx of substance abuse		  Alcohol 	                                          3	                  3  Illegal drugs                                     4	                  4  Rx drugs                                            5 	                  5  Age between 16- 45 years	           1                     1  hx preadolescent sexual abuse	   3 	                  0  Psychological disease		  ADD, OCD, bipolar, schizophrenia   2	          2  Depression                                           1 	          1  Total: 0    a score of 3 or lower indicates low risk for opioid abuse		  a score of 4-7 indicates moderate risk for opioid abuse		  a score of 8 or higher indicates high risk for opioid abuse      4AT (Assessment test for delirium & cognitive impairment)  _________________________________________________________  [1] ALERTNESS  This includes patients who may be markedly drowsy (eg. difficult to rouse and/or obviously sleepy  during assessment) or agitated/hyperactive. Observe the patient. If asleep, attempt to wake with  speech or gentle touch on shoulder. Ask the patient to state their name and address to assist rating.  Normal (fully alert, but not agitated, throughout assessment) 0  Mild sleepiness for <10 seconds after waking, then normal 0  Clearly abnormal 4    [2] AMT4  Age, date of birth, place (name of the hospital or building), current year.  No mistakes 0  1 mistake 1  2 or more mistakes/untestable 2    [3] ATTENTION  Ask the patient: “Please tell me the months of the year in backwards order, starting at December.”  To assist initial understanding one prompt of “what is the month before December?” is permitted.  Months of the year backwards Achieves 7 months or more correctly 0  Starts but scores <7 months / refuses to start 1   Untestable (cannot start because unwell, drowsy, inattentive) 2    [4] ACUTE CHANGE OR FLUCTUATING COURSE  Evidence of significant change or fluctuation in: alertness, cognition, other mental function  (eg. paranoia, hallucinations) arising over the last 2 weeks and still evident in last 24hrs  No 0  Yes 4    4 or above: possible delirium +/- cognitive impairment  1-3: possible cognitive impairment  0: delirium or severe cognitive impairment unlikely (but delirium still possible if [4] information incomplete)    4AT SCORE: 3      REVIEW OF SYSTEMS:  CONSTITUTIONAL: No fever or fatigue  RESPIRATORY: No cough, wheezing, chills or hemoptysis; No shortness of breath  CARDIOVASCULAR: No chest pain, palpitations, dizziness, or leg swelling  GASTROINTESTINAL: No abdominal or epigastric pain. No nausea, vomiting; No diarrhea or constipation.   GENITOURINARY: Urinary catheter in place  MUSCULOSKELETAL: Positive Rt knee joint pain. No swelling; No muscle, back, or extremity pain, no upper or lower motor strength weakness, no saddle anesthesia, bowel/bladder incontinence, no falls   NEURO: No headaches, No numbness/tingling b/l LE, No weakness  PSYCHIATRIC: Positive anxiety, No depression, mood swings, or difficulty sleeping    PHYSICAL EXAM:  GENERAL:  Alert & Oriented X2, NAD, Good concentration  CHEST/LUNG: Clear to auscultation bilaterally; No rales, rhonchi, wheezing, or rubs  HEART: Regular rate and rhythm; No murmurs, rubs, or gallops  ABDOMEN: Soft, Nontender, Nondistended; Bowel sounds present  GENITOURINARY: Urinary catheter in place  EXTREMITIES:  Rt knee tender to palpation. 2+ Peripheral Pulses, No cyanosis, or edema  MUSCULOSKELETAL: Motor Strength 5/5 B/L upper and lower extremities; moves all extremities equally against gravity; ROM intact; negative SLR  SKIN: No rashes or lesions    Risk factors associated with adverse outcomes related to opioid treatment  [ ]  Concurrent benzodiazepine use  [ ]  History/ Active substance use or alcohol use disorder  [X ] Psychiatric co-morbidity  [ ] Sleep apnea  [ ] COPD  [ ] BMI> 35  [ ] Liver dysfunction  [ ] Renal dysfunction  [ ] CHF  [ ] Smoker  [X ]  Age > 60 years    [ ]  [place]  Reviewed and Copied to Chart. See below.    Plan of care and goal oriented pain management treatment options were discussed with patient and /or primary care giver; all questions and concerns were addressed and care was aligned with patient's wishes.    Educated patient on goal oriented pain management treatment options     20 @ 12:01

## 2020-07-06 NOTE — DISCHARGE NOTE PROVIDER - NSDCMRMEDTOKEN_GEN_ALL_CORE_FT
ferrous sulfate 325 mg (65 mg elemental iron) oral tablet: 1 tab(s) orally once a day  Flomax 0.4 mg oral capsule: 1 cap(s) orally once a day  lisinopril 10 mg oral tablet: 0.5 tab(s) orally once a day  meloxicam:   mirtazapine 15 mg oral tablet: 1 tab(s) orally once a day (at bedtime)  Remeron 15 mg oral tablet: 1 tab(s) orally once a day (at bedtime) acetaminophen 325 mg oral tablet: 2 tab(s) orally every 6 hours, As needed, Moderate Pain (4 - 6)  aspirin 81 mg oral delayed release tablet: 1 tab(s) orally once a day  cholecalciferol oral tablet: 1000 unit(s) orally once a day  ferrous sulfate 325 mg (65 mg elemental iron) oral tablet: 1 tab(s) orally once a day  Flomax 0.4 mg oral capsule: 1 cap(s) orally once a day  lidocaine 5% topical film: Apply topically to affected area once a day  lisinopril 5 mg oral tablet: 1 tab(s) orally once a day   mirtazapine 15 mg oral tablet: 1 tab(s) orally once a day (at bedtime)

## 2020-07-06 NOTE — DIETITIAN INITIAL EVALUATION ADULT. - NS FNS WEIGHT USED FOR CALC
Ht=6'    BAM=670 lb   Admission bi=571 lb ?  Current fg=766.1 lb 7/2/2020; 174.3 lb 7/6/2020     BMI=23.6/ideal

## 2020-07-06 NOTE — DISCHARGE NOTE PROVIDER - HOSPITAL COURSE
HPI:    89 y M from home , lives with daughter , ambulates with walker PMH of severe OA, CLL, sacral pressure ulcer,  macular degeneration , right hip fracture (3 years ago) BPH, HTN, anxiety PSH  cataract surgery presented sp mechanical fall , no head trauma , no LOC, no prodromal symptoms or seizure activity , no focal motor sensory deficit. On my evaluation patient is AAOX1-2. History mostly taken from daughter who reported aggravation in his bilateral knee osteoarthritis pain during past few humid climate. Patient buckled his knees upon standing and fell on his buttock. Patient was c/o b/l knee pain. Otherwise ROS was unremarkable        ED course : ECG was significant for new onset Afib with PVC , rate is controlled below 100     GOC: DNR/DNI (01 Jul 2020 17:55) HPI:    89 y M from home , lives with daughter , ambulates with walker PMH of severe OA, CLL, sacral pressure ulcer,  macular degeneration , right hip fracture (3 years ago) BPH, HTN, anxiety PSH  cataract surgery presented after a mechanical fall outside. He doesn't fully recollect the events leading up to the fall, but denies head trauma , LOC, symptoms or seizure activity , no focal motor sensory deficit. On my evaluation patient is AAOX1-2. History mostly taken from daughter who reported aggravation in his bilateral knee osteoarthritis pain during past few humid climate. Patient buckled his knees upon standing and fell on his buttock. Patient was c/o b/l knee pain. Otherwise ROS was unremarkable        ED course : ECG was significant for new onset Afib with PVC , rate is controlled below 100     GOC: DNR/DNI (01 Jul 2020 17:55) 89 y M from home , lives with daughter , ambulates with walker PMH of severe OA, CLL, sacral pressure ulcer,  macular degeneration , right hip fracture (3 years ago) BPH, HTN, anxiety PSH  cataract surgery presented after a mechanical fall outside.  Mechanism of the fall was from patient's knees buckling upon standing and he fell on his buttocks. He denies head trauma , LOC, prior seizure activity, or focal motor sensory deficit. History on admission was taken from his daughter who reported aggravation of his bilateral knee osteoarthritis pain in the recent weeks. On admission, his EKG was significant for a new onset Atrial fibrillation with preserved ventricular rate below 100 beats/minute. After consulting with cardiology, the decision was made not to anticoagulate because of his age and comorbid conditions; this decision can be made on an outpatient basis after rehabilitation. on hospital day 3, the patient developed a productive cough, crackles on chest examination, and elevation in white count after possible aspiration of food. He was started on Inj Unasyn to prevent aspiration pneumonia and a swallow study was done which found that he was able to tolerate a soft mechanical diet with thin liquids. A pain consult was done to optimize the treatment for his B/l OA of knees as well. On hospital day 5, he was found to be have been retaining 946cc of urine on bladder scan, probably due to longstanding benign prostatic hypertrophy for which a christina catheter was passed. He was subjected to a trail of voiding the next day to determine if he needs discharge with a christina.      made     ED course : ECG was significant for      GOC: DNR/DNI (01 Jul 2020 17:55) 89 y M from home , lives with daughter , ambulates with walker PMH of severe OA, CLL, sacral pressure ulcer,  macular degeneration , right hip fracture (3 years ago) BPH, HTN, anxiety PSH  cataract surgery presented after a mechanical fall outside.  Mechanism of the fall was from patient's knees buckling upon standing and he fell on his buttocks. He denies head trauma , LOC, prior seizure activity, or focal motor sensory deficit. History on admission was taken from his daughter who reported aggravation of his bilateral knee osteoarthritis pain in the recent weeks. On admission, his EKG was significant for a new onset Atrial fibrillation with preserved ventricular rate below 100 beats/minute. After consulting with cardiology, the decision was made not to anticoagulate because of his age and comorbid conditions; this decision can be made on an outpatient basis after rehabilitation. on hospital day 3, the patient developed a productive cough, crackles on chest examination, and elevation in white count after possible aspiration of food. He was started on Inj Unasyn to prevent aspiration pneumonia and a swallow study was done which found that he was able to tolerate a soft mechanical diet with thin liquids. A pain consult was done to optimize the treatment for his B/l OA of knees as well. On hospital day 5, he was found to be have been retaining 946cc of urine on bladder scan, probably due to longstanding benign prostatic hypertrophy for which a christina catheter was passed. He was subjected to a trail of voiding the next day to determine if he needs discharge with a christina.      GOC: DNR/DNI (01 Jul 2020 17:55) 89 y M from home , lives with daughter , ambulates with walker PMH of severe OA, CLL, sacral pressure ulcer,  macular degeneration , right hip fracture (3 years ago) BPH, HTN, anxiety PSH  cataract surgery presented after a mechanical fall outside.  Mechanism of the fall was from patient's knees buckling upon standing and he fell on his buttocks. He denies head trauma , LOC, prior seizure activity, or focal motor sensory deficit. History on admission was taken from his daughter who reported aggravation of his bilateral knee osteoarthritis pain in the recent weeks. On admission, his EKG was significant for a new onset Atrial fibrillation with preserved ventricular rate below 100 beats/minute. After consulting with cardiology, the decision was made not to anticoagulate because of his age, risk for fall and comorbid conditions; this decision can be made on an outpatient basis after rehabilitation. on hospital day 3, the patient developed a productive cough, crackles on chest examination, and elevation in white count after possible aspiration of food. He was started on Inj Unasyn to treat aspiration pneumonia and a swallow study was done which found that he was able to tolerate a soft mechanical diet with thin liquids. A pain consult was done to optimize the treatment for his B/l OA of knees as well. On hospital day 5, he was found to be have been retaining 946cc of urine on bladder scan, probably due to longstanding benign prostatic hypertrophy for which a christina catheter was passed. Christina was removed on day 7th and but pt failed trail of void and pt will be discharged [facility]O with christina with trail of void in CIRO and out pt urology follow up.       GOC: DNR/DNI (01 Jul 2020 17:55)

## 2020-07-06 NOTE — DISCHARGE NOTE PROVIDER - ATTENDING COMMENTS
MEDICAL ATTENDING DISCHARGE NOTE            Vital Signs Last 24 Hrs    T(C): 36.4 (07 Jul 2020 11:32), Max: 37 (07 Jul 2020 04:35)    T(F): 97.6 (07 Jul 2020 11:32), Max: 98.6 (07 Jul 2020 04:35)    HR: 68 (07 Jul 2020 12:35) (61 - 87)    BP: 127/49 (07 Jul 2020 12:35) (119/56 - 143/52)    BP(mean): 69 (07 Jul 2020 12:35) (63 - 69)    RR: 18 (07 Jul 2020 11:32) (18 - 19)    SpO2: 100% (07 Jul 2020 12:35) (98% - 100%)        _________________    ROS: Negative.        PHYSICAL EXAM:    ---------------------------     NAD; Normocephalic;     LUNGS - no wheezing    HEART: S1 S2+     ABDOMEN: Soft, Nontender, non distended    EXTREMITIES: no cyanosis; no edema                _________________________________________________    LABS:                            12.3     12.15 )-----------( 324      ( 07 Jul 2020 07:18 )               38.0         07-07        144  |  109<H>  |  15    ----------------------------<  80    3.2<L>   |  26  |  0.63        Ca    8.7      07 Jul 2020 07:18                    A/P:     S/P Fall - CIRO ; fall precaution; optimize pain control, vitamin D supplements.     New onset Afib w rvr - high chadvasc - however, given pts age and high risk of repeated falls , decsion made by cardio not to start pt on anti-coagulation.     Severe OA - Seen by pain management to optimize pain control and avoid future fall risk.     CLL - being managed outpt.     Urinary retention - pelvic ultrasound done. Per Urology pt to be dc with christina and follow outpt.     BPH, HTN - continue current meds.     Pt needs follow up with pcp and urology .

## 2020-07-06 NOTE — PROGRESS NOTE ADULT - PROBLEM SELECTOR PLAN 1
Dementia work up   B12 , folate, RPR was negative   CTH  Ammonia ordered to r/o acute enceph    - Due to progressive needs, will  arrange with  for discharge to [facility]

## 2020-07-06 NOTE — DISCHARGE NOTE PROVIDER - CARE PROVIDER_API CALL
Jaspreet Egan J  UROLOGY  [address] [address] [place] [id number]  Phone: [phone]  Fax: [phone]  Follow Up Time:     Irvin Arenas  CARDIOVASCULAR DISEASE  [address] [address] [place] [address]  Phone: [phone]  Fax: [phone]  Follow Up Time:

## 2020-07-06 NOTE — PROGRESS NOTE ADULT - SUBJECTIVE AND OBJECTIVE BOX
PGY-1 Progress Note discussed with attending    PAGER #: [[phone]] TILL 5:00 PM  PLEASE CONTACT ON CALL TEAM:  - On Call Team (Please refer to Aide) FROM 5:00 PM - 8:30PM  - Nightfloat Team FROM 8:30 -7:30 AM    CHIEF COMPLAINT & BRIEF HOSPITAL COURSE:  89 y/o male with PMHx of OA, HTN, sacral ulcer, Afib with PVR came with complaints of a mechanical fall.       INTERVAL HPI/OVERNIGHT EVENTS:   946cc urine found on bladder scan and patient was catheterized.       REVIEW OF SYSTEMS:  CONSTITUTIONAL: No fever, weight loss, or fatigue  RESPIRATORY: No cough, wheezing, chills or hemoptysis; No shortness of breath  CARDIOVASCULAR: No chest pain, palpitations, dizziness, or leg swelling  GASTROINTESTINAL: No abdominal pain. No nausea, vomiting, or hematemesis; No diarrhea or constipation. No melena or hematochezia.  GENITOURINARY: No dysuria or hematuria, urinary frequency  NEUROLOGICAL: No headaches, memory loss, loss of strength, numbness, or tremors  SKIN: No itching, burning, rashes, or lesions     MEDICATIONS  (STANDING):  ampicillin/sulbactam  IVPB      ampicillin/sulbactam  IVPB 1.5 Gram(s) IV Intermittent every 6 hours  aspirin enteric coated 81 milliGRAM(s) Oral daily  cholecalciferol 1000 Unit(s) Oral daily  enoxaparin Injectable 40 milliGRAM(s) SubCutaneous daily  lidocaine   Patch 2 Patch Transdermal daily  lisinopril 5 milliGRAM(s) Oral daily  mirtazapine 15 milliGRAM(s) Oral at bedtime  sodium chloride 0.9%. 1000 milliLiter(s) (75 mL/Hr) IV Continuous <Continuous>  tamsulosin 0.4 milliGRAM(s) Oral at bedtime    MEDICATIONS  (PRN):  guaiFENesin   Syrup  (Sugar-Free) 100 milliGRAM(s) Oral every 6 hours PRN Cough      Vital Signs Last 24 Hrs  T(C): 36.3 (06 Jul 2020 11:03), Max: 36.6 (05 Jul 2020 23:36)  T(F): 97.3 (06 Jul 2020 11:03), Max: 97.8 (05 Jul 2020 23:36)  HR: 70 (06 Jul 2020 11:03) (68 - 73)  BP: 111/45 (06 Jul 2020 11:03) (90/77 - 138/66)  BP(mean): --  RR: 18 (06 Jul 2020 11:03) (18 - 18)  SpO2: 98% (06 Jul 2020 11:03) (98% - 99%)    PHYSICAL EXAMINATION:  GENERAL: NAD, well built  HEAD:  Atraumatic, Normocephalic  EYES:  conjunctiva and sclera clear  NECK: Supple, No JVD, Normal thyroid  CHEST/LUNG: Clear to auscultation. Clear to percussion bilaterally; No rales, rhonchi, wheezing, or rubs  HEART: Regular rate and rhythm; No murmurs, rubs, or gallops  ABDOMEN: Soft, Nontender, Nondistended; Bowel sounds present  NERVOUS SYSTEM:  Alert & Oriented X3,    EXTREMITIES:  2+ Peripheral Pulses, No clubbing, cyanosis, or edema  SKIN: warm dry                          11.0   10.45 )-----------( 266      ( 06 Jul 2020 06:03 )             34.8     07-06    146<H>  |  111<H>  |  20<H>  ----------------------------<  81  3.8   |  27  |  0.62    Ca    8.5      06 Jul 2020 06:03                CAPILLARY BLOOD GLUCOSE      RADIOLOGY & ADDITIONAL TESTS: PGY-1 Progress Note discussed with attending    PAGER #: [[phone]] TILL 5:00 PM  PLEASE CONTACT ON CALL TEAM:  - On Call Team (Please refer to Aide) FROM 5:00 PM - 8:30PM  - Nightfloat Team FROM 8:30 -7:30 AM    CHIEF COMPLAINT & BRIEF HOSPITAL COURSE:  89 y/o male with PMHx of OA, HTN, sacral ulcer, Afib with PVR came with complaints of a mechanical fall. His productive cough has resolved and he denies any fevers, SOB, chest pain, palpitations or swelling of the legs. He is awaiting a trail of void and a negative COVID test prior to being discharged to the [facility].      INTERVAL HPI/OVERNIGHT EVENTS:   946cc urine found on bladder scan and patient was catheterized, probable cause is obstruction due to BPH.      REVIEW OF SYSTEMS:  CONSTITUTIONAL: No fever, weight loss, or fatigue  RESPIRATORY: No cough, wheezing, chills or hemoptysis; No shortness of breath  CARDIOVASCULAR: No chest pain, palpitations, dizziness, or leg swelling  GASTROINTESTINAL: No abdominal pain. No nausea, vomiting, or hematemesis; No diarrhea or constipation. No melena or hematochezia.  GENITOURINARY: No dysuria or hematuria, urinary frequency  NEUROLOGICAL: Memory Loss/ dementia + No headaches, loss of strength, numbness, or tremors  SKIN: No itching, burning, rashes, or lesions     MEDICATIONS  (STANDING):  ampicillin/sulbactam  IVPB      ampicillin/sulbactam  IVPB 1.5 Gram(s) IV Intermittent every 6 hours  aspirin enteric coated 81 milliGRAM(s) Oral daily  cholecalciferol 1000 Unit(s) Oral daily  enoxaparin Injectable 40 milliGRAM(s) SubCutaneous daily  lidocaine   Patch 2 Patch Transdermal daily  lisinopril 5 milliGRAM(s) Oral daily  mirtazapine 15 milliGRAM(s) Oral at bedtime  sodium chloride 0.9%. 1000 milliLiter(s) (75 mL/Hr) IV Continuous <Continuous>  tamsulosin 0.4 milliGRAM(s) Oral at bedtime    MEDICATIONS  (PRN):  guaiFENesin   Syrup  (Sugar-Free) 100 milliGRAM(s) Oral every 6 hours PRN Cough      Vital Signs Last 24 Hrs  T(C): 36.3 (06 Jul 2020 11:03), Max: 36.6 (05 Jul 2020 23:36)  T(F): 97.3 (06 Jul 2020 11:03), Max: 97.8 (05 Jul 2020 23:36)  HR: 70 (06 Jul 2020 11:03) (68 - 73)  BP: 111/45 (06 Jul 2020 11:03) (90/77 - 138/66)  BP(mean): --  RR: 18 (06 Jul 2020 11:03) (18 - 18)  SpO2: 98% (06 Jul 2020 11:03) (98% - 99%)    PHYSICAL EXAMINATION:  GENERAL: NAD, well built  HEAD:  Atraumatic, Normocephalic  EYES:  conjunctiva and sclera clear  NECK: Supple, No JVD, Normal thyroid  CHEST/LUNG: Clear to auscultation. Clear to percussion bilaterally; No rales, rhonchi, wheezing, or rubs  HEART: Regular rate and rhythm; No murmurs, rubs, or gallops  ABDOMEN: Soft, Nontender, Nondistended; Bowel sounds present  NERVOUS SYSTEM:  Alert & Oriented X3,    EXTREMITIES:  2+ Peripheral Pulses, No clubbing, cyanosis, or edema  SKIN: warm dry                          11.0   10.45 )-----------( 266      ( 06 Jul 2020 06:03 )             34.8     07-06    146<H>  |  111<H>  |  20<H>  ----------------------------<  81  3.8   |  27  |  0.62    Ca    8.5      06 Jul 2020 06:03                CAPILLARY BLOOD GLUCOSE      RADIOLOGY & ADDITIONAL TESTS:

## 2020-07-06 NOTE — SWALLOW BEDSIDE ASSESSMENT ADULT - SWALLOW EVAL: DIAGNOSIS
Pt presents with slow yet functional oral management and strong/timely pharyngeal swallow for Mech Soft solids and Thin liquids. No overt clinical s&s of penetration/aspiration observed at time of this exam.

## 2020-07-06 NOTE — SWALLOW BEDSIDE ASSESSMENT ADULT - COMMENTS
Pt received upright in chair. Alert, mildly confused, yet very pleasant and cooperative. Pt noted missing multiple posterior teeth.

## 2020-07-06 NOTE — SWALLOW BEDSIDE ASSESSMENT ADULT - SLP PERTINENT HISTORY OF CURRENT PROBLEM
89 y M from home , lives with daughter , ambulates with walker PMH of severe OA, CLL, sacral pressure ulcer, dementia, macular degeneration , right hip fracture (3 years ago) BPH, HTN, anxiety PSH  cataract surgery presented sp mechanical fall , no head trauma , no LOC, no prodromal symptoms or seizure activity , no focal motor sensory deficit. Chest X-ray revealed clear lungs.

## 2020-07-06 NOTE — DISCHARGE NOTE PROVIDER - NSDCCPCAREPLAN_GEN_ALL_CORE_FT
PRINCIPAL DISCHARGE DIAGNOSIS  Diagnosis: Atrial fibrillation, unspecified type  Assessment and Plan of Treatment:       SECONDARY DISCHARGE DIAGNOSES  Diagnosis: Pneumonia  Assessment and Plan of Treatment:     Diagnosis: Hypertrophy of prostate with urinary retention  Assessment and Plan of Treatment:     Diagnosis: Failure to thrive in adult  Assessment and Plan of Treatment:     Diagnosis: Fall  Assessment and Plan of Treatment: Fall    Diagnosis: Acute bilateral knee pain  Assessment and Plan of Treatment: Acute bilateral knee pain PRINCIPAL DISCHARGE DIAGNOSIS  Diagnosis: Atrial fibrillation, unspecified type  Assessment and Plan of Treatment: You were found to have a new onset arrythmia of the heart with a fast rate less than 100 beats/ minute. This condition predisposes you to having strokes and other blood clotting disorders for which anticoagulation is usually prescribed based on your risk factors. In view of your age and fall risk at the current time, the decision was made after consulting cardiology to defer starting anticoagulation till after rehab.      SECONDARY DISCHARGE DIAGNOSES  Diagnosis: Pneumonia  Assessment and Plan of Treatment: You had an episode of choking on your food, after which you developed a rise in white count, productive cough, and crackles on chest exam which was suggestive of an aspiration pneumonia. We started you on antibiotics and got a chest X-ray to treat you for this.    Diagnosis: Hypertrophy of prostate with urinary retention  Assessment and Plan of Treatment: You were previously started on a medication called Flomax for this condition. At this visit, you retained 946 cc of urine and were unable to pass it without catheterization.    Diagnosis: Fall  Assessment and Plan of Treatment: We investigated you for cardiac and neurological causes of fall and found them to be negative. You most likely had a mechanical fall from bilateral knee pain for which we optimized your osteoarthritis pain management.    Diagnosis: Acute bilateral knee pain  Assessment and Plan of Treatment: We optimized the pain management for your severe osteoarthritis of knees. We also confirmed the diagnosis with a knee Xray.    Diagnosis: Failure to thrive in adult  Assessment and Plan of Treatment: PRINCIPAL DISCHARGE DIAGNOSIS  Diagnosis: Atrial fibrillation, unspecified type  Assessment and Plan of Treatment: You were found to have a new onset arrythmia of the heart with a fast rate less than 100 beats/ minute. This condition predisposes you to having strokes and other blood clotting disorders for which anticoagulation is usually prescribed based on your risk factors. In view of your age and fall risk at the current time, the decision was made after consulting cardiology to defer starting anticoagulation till after rehab.      SECONDARY DISCHARGE DIAGNOSES  Diagnosis: Pneumonia  Assessment and Plan of Treatment: You had an episode of choking on your food, after which you developed a rise in white count, productive cough, and crackles on chest exam which was suggestive of an aspiration pneumonia. We started you on antibiotics and got a chest X-ray to treat you for this.    Diagnosis: Hypertrophy of prostate with urinary retention  Assessment and Plan of Treatment: You were previously started on a medication called Flomax for this condition. At this visit, you retained 946 cc of urine and were unable to pass it without catheterization.    Diagnosis: Fall  Assessment and Plan of Treatment: We investigated you for cardiac and neurological causes of fall and found them to be negative. You most likely had a mechanical fall from bilateral knee pain for which we optimized your osteoarthritis pain management.    Diagnosis: Acute bilateral knee pain  Assessment and Plan of Treatment: We optimized the pain management for your severe osteoarthritis of knees. We also confirmed the diagnosis with a knee Xray.    Diagnosis: Failure to thrive in adult  Assessment and Plan of Treatment: You were not consuming calories sufficent to maintain your current body mass index (25.2kg/m^2). The dietician and speech and swallow therapists evaluated you and found that you tolerate a soft mechanical diet with thin liquids well. We recommend continuing this diet after discharge to the rehabilitation facility. PRINCIPAL DISCHARGE DIAGNOSIS  Diagnosis: Atrial fibrillation, unspecified type  Assessment and Plan of Treatment: You were found to have a new onset arrythmia of the heart with a fast rate less than 100 beats/ minute. This condition predisposes you to having strokes and other blood clotting disorders for which anticoagulation is usually prescribed based on your risk factors. In view of your age and fall risk at the current time, the decision was made after consulting cardiology to defer starting anticoagulation till after rehab.      SECONDARY DISCHARGE DIAGNOSES  Diagnosis: Pneumonia  Assessment and Plan of Treatment: You had an episode of choking on your food, after which you developed a rise in white count, productive cough, and crackles on chest exam which was suggestive of an aspiration pneumonia. We started you on antibiotics and got a chest X-ray to treat you for this.    Diagnosis: Hypertrophy of prostate with urinary retention  Assessment and Plan of Treatment: You were previously started on a medication called Flomax for this condition. At this visit, you retained 946 cc of urine and were unable to pass it without catheterization. Please follow up with urology in a week.    Diagnosis: Failure to thrive in adult  Assessment and Plan of Treatment: You were not consuming calories sufficent to maintain your current body mass index (25.2kg/m^2). The dietician and speech and swallow therapists evaluated you and found that you tolerate a soft mechanical diet with thin liquids well. We recommend continuing this diet after discharge to the rehabilitation facility.    Diagnosis: Fall  Assessment and Plan of Treatment: We investigated you for cardiac and neurological causes of fall and found them to be negative. You most likely had a mechanical fall from bilateral knee pain for which we optimized your osteoarthritis pain management.    Diagnosis: Acute bilateral knee pain  Assessment and Plan of Treatment: We optimized the pain management for your severe osteoarthritis of knees. We also confirmed the diagnosis with a knee Xray.

## 2020-07-06 NOTE — SWALLOW BEDSIDE ASSESSMENT ADULT - SWALLOW EVAL: RECOMMENDED FEEDING/EATING TECHNIQUES
small sips/bites/allow for swallow between intakes/alternate food with liquid/maintain upright posture during/after eating for 30 mins/oral hygiene

## 2020-07-06 NOTE — PROGRESS NOTE ADULT - ATTENDING COMMENTS
MEDICAL ATTENDING NOTE    Patient is a 89y old  Male who presents with a chief complaint of fall (2020 11:32)      INTERVAL HPI/OVERNIGHT EVENTS: offers no new complaints today    MEDICATIONS  (STANDING):  ampicillin/sulbactam  IVPB      ampicillin/sulbactam  IVPB 1.5 Gram(s) IV Intermittent every 6 hours  aspirin enteric coated 81 milliGRAM(s) Oral daily  cholecalciferol 1000 Unit(s) Oral daily  enoxaparin Injectable 40 milliGRAM(s) SubCutaneous daily  lidocaine   Patch 2 Patch Transdermal daily  lisinopril 5 milliGRAM(s) Oral daily  mirtazapine 15 milliGRAM(s) Oral at bedtime  sodium chloride 0.9%. 1000 milliLiter(s) (60 mL/Hr) IV Continuous <Continuous>  tamsulosin 0.4 milliGRAM(s) Oral at bedtime    MEDICATIONS  (PRN):  guaiFENesin   Syrup  (Sugar-Free) 100 milliGRAM(s) Oral every 6 hours PRN Cough    REVIEW OF SYSTEMS: negative    Vital Signs Last 24 Hrs  T(C): 36.3 (2020 11:03), Max: 36.6 (2020 23:36)  T(F): 97.3 (2020 11:03), Max: 97.8 (2020 23:36)  HR: 81 (2020 12:09) (68 - 81)  BP: 118/59 (2020 12:09) (90/77 - 138/66)  BP(mean): --  RR: 18 (2020 11:03) (18 - 18)  SpO2: 98% (2020 12:09) (97% - 99%)    _________________  PHYSICAL EXAM:  ---------------------------   NAD; Normocephalic;   LUNGS - no wheezing  HEART: S1 S2+   ABDOMEN: Soft, Nontender, non distended  EXTREMITIES: no cyanosis; no edema  NERVOUS SYSTEM:  Awake and alert; no focal neuro deficits appreciated    _________________________________________________  LABS:                        11.0   10.45 )-----------( 266      ( 2020 06:03 )             34.8     07-06    146<H>  |  111<H>  |  20<H>  ----------------------------<  81  3.8   |  27  |  0.62    Ca    8.5      2020 06:03        Urinalysis Basic - ( 2020 18:14 )    Color: Yellow / Appearance: Clear / S.020 / pH: x  Gluc: x / Ketone: Trace  / Bili: Negative / Urobili: Negative   Blood: x / Protein: 30 mg/dL / Nitrite: Negative   Leuk Esterase: Negative / RBC: 0-2 /HPF / WBC 0-2 /HPF   Sq Epi: x / Non Sq Epi: Few /HPF / Bacteria: Trace /HPF    Pt seen and evaluated by bedside. Resting comfortably. No cough or secretions as compared to 2 days ago.   -Pt had productive cough and secretions, weakness, since 2 days significantly improved, was started on Unasyn. Continue oral suction as needed. Elevate head of bed. Aspiration precaution. Can dc Unasyn.   -Speech and swallow evaluation - ok for soft mechanical, nectar thick  - Pre-renal azotemia. likely due to decrease oral intake - judicious IV hydration. Remove christina and monitor urine output, if no output - US to rule out obstruction. Continue Flomax.  - Awaiting covid test results for transfer to [facility]  -DVt ppx.  Plan of care was discussed with patient ; all questions and concerns were addressed and care was aligned with patient's wishes.

## 2020-07-06 NOTE — PROGRESS NOTE ADULT - PROBLEM SELECTOR PLAN 3
patient p/w mechanical fall 2/2 severe OA  Xray pelvis negative for acute fractures  CT head negative for intracranial pathology patient p/w mechanical fall 2/2 severe OA  Xray pelvis negative for acute fractures  CT head negative for intracranial pathology  Vitmain D supplementation  Fall Precaution

## 2020-07-06 NOTE — DIETITIAN INITIAL EVALUATION ADULT. - OTHER INFO
Pt alert, verbally responsive, but confused with dementia reported, lives home with family PTA; Failure to thrive, needs encouragement when eating, doing well with a trial of very soft food and thickened liquids per MD; 75% intake at times per flow sheet, observed breakfast 50% intake; changed to dysphagia/thicken liquid diet as coughing with food, pending Swallow evaluation; on discharge planning to skilled nursing facility for rehab when medically ready; Discussed with RN

## 2020-07-06 NOTE — DIETITIAN INITIAL EVALUATION ADULT. - PERTINENT MEDS FT
MEDICATIONS  (STANDING):  ampicillin/sulbactam  IVPB      ampicillin/sulbactam  IVPB 1.5 Gram(s) IV Intermittent every 6 hours  aspirin enteric coated 81 milliGRAM(s) Oral daily  cholecalciferol 1000 Unit(s) Oral daily  enoxaparin Injectable 40 milliGRAM(s) SubCutaneous daily  lidocaine   Patch 2 Patch Transdermal daily  lisinopril 5 milliGRAM(s) Oral daily  mirtazapine 15 milliGRAM(s) Oral at bedtime  sodium chloride 0.9%. 1000 milliLiter(s) (75 mL/Hr) IV Continuous <Continuous>  tamsulosin 0.4 milliGRAM(s) Oral at bedtime

## 2020-07-07 NOTE — CONSULT NOTE ADULT - ASSESSMENT
hx urine retention  now christina in place.  on flomax as op and continued this admission    Keep christina in place for now  folwy may be placed to leg bag to assist pt with ambulation/ rehab as needed  monitor urine output  continue flomax  pt to follow up with urologist as op within 1-2 weeks hx urine retention  now christina in place.  on flomax as op and continued this admission    Keep christina in place for now  christina may be placed to leg bag to assist pt with ambulation/ rehab as needed  monitor urine output  continue flomax  pt to follow up with urologist as op within 1-2 weeks

## 2020-07-07 NOTE — PROGRESS NOTE ADULT - SUBJECTIVE AND OBJECTIVE BOX
PGY-1 Progress Note discussed with attending    PAGER #: [[phone]] TILL 5:00 PM  PLEASE CONTACT ON CALL TEAM:  - On Call Team (Please refer to Aide) FROM 5:00 PM - 8:30PM  - Nightfloat Team FROM 8:30 -7:30 AM    CHIEF COMPLAINT & BRIEF HOSPITAL COURSE:    INTERVAL HPI/OVERNIGHT EVENTS:       REVIEW OF SYSTEMS:  CONSTITUTIONAL: No fever, weight loss, or fatigue  RESPIRATORY: No cough, wheezing, chills or hemoptysis; No shortness of breath  CARDIOVASCULAR: No chest pain, palpitations, dizziness, or leg swelling  GASTROINTESTINAL: No abdominal pain. No nausea, vomiting, or hematemesis; No diarrhea or constipation. No melena or hematochezia.  GENITOURINARY: No dysuria or hematuria, urinary frequency  NEUROLOGICAL: No headaches, memory loss, loss of strength, numbness, or tremors  SKIN: No itching, burning, rashes, or lesions     MEDICATIONS  (STANDING):  ampicillin/sulbactam  IVPB      ampicillin/sulbactam  IVPB 1.5 Gram(s) IV Intermittent every 6 hours  aspirin enteric coated 81 milliGRAM(s) Oral daily  cholecalciferol 1000 Unit(s) Oral daily  enoxaparin Injectable 40 milliGRAM(s) SubCutaneous daily  lidocaine   Patch 2 Patch Transdermal daily  lisinopril 5 milliGRAM(s) Oral daily  mirtazapine 15 milliGRAM(s) Oral at bedtime  sodium chloride 0.9%. 1000 milliLiter(s) (60 mL/Hr) IV Continuous <Continuous>  tamsulosin 0.4 milliGRAM(s) Oral at bedtime    MEDICATIONS  (PRN):  acetaminophen   Tablet .. 650 milliGRAM(s) Oral every 6 hours PRN Moderate Pain (4 - 6)  guaiFENesin   Syrup  (Sugar-Free) 100 milliGRAM(s) Oral every 6 hours PRN Cough      Vital Signs Last 24 Hrs  T(C): 36.4 (07 Jul 2020 07:46), Max: 37 (07 Jul 2020 04:35)  T(F): 97.6 (07 Jul 2020 07:46), Max: 98.6 (07 Jul 2020 04:35)  HR: 65 (07 Jul 2020 07:46) (65 - 87)  BP: 130/45 (07 Jul 2020 07:46) (111/45 - 141/64)  BP(mean): --  RR: 18 (07 Jul 2020 07:46) (18 - 19)  SpO2: 99% (07 Jul 2020 07:46) (97% - 100%)    PHYSICAL EXAMINATION:  GENERAL: NAD, well built  HEAD:  Atraumatic, Normocephalic  EYES:  conjunctiva and sclera clear  NECK: Supple, No JVD, Normal thyroid  CHEST/LUNG: Clear to auscultation. Clear to percussion bilaterally; No rales, rhonchi, wheezing, or rubs  HEART: Regular rate and rhythm; No murmurs, rubs, or gallops  ABDOMEN: Soft, Nontender, Nondistended; Bowel sounds present  NERVOUS SYSTEM:  Alert & Oriented X3,    EXTREMITIES:  2+ Peripheral Pulses, No clubbing, cyanosis, or edema  SKIN: warm dry                          12.3   12.15 )-----------( 324      ( 07 Jul 2020 07:18 )             38.0     07-07    144  |  109<H>  |  15  ----------------------------<  80  3.2<L>   |  26  |  0.63    Ca    8.7      07 Jul 2020 07:18                CAPILLARY BLOOD GLUCOSE      RADIOLOGY & ADDITIONAL TESTS: PGY-1 Progress Note discussed with attending    PAGER #: [[phone]] TILL 5:00 PM  PLEASE CONTACT ON CALL TEAM:  - On Call Team (Please refer to Aide) FROM 5:00 PM - 8:30PM  - Nightfloat Team FROM 8:30 -7:30 AM    CHIEF COMPLAINT & BRIEF HOSPITAL COURSE:  89 y/o male with OA, HTN, Sacral ulcer, afin +PVR was admitted for mechanical fall. He was sleeping deeply in the morning when we rounded, but was responsive when we woke him. He ate well, and had normal bowel and bladder movements. He failed a trial of void yesterday night and a catheter was reinserted to drain around 500 cc of urine. He will be discharged to [facility] with catheter in place.       INTERVAL HPI/OVERNIGHT EVENTS:       REVIEW OF SYSTEMS:  CONSTITUTIONAL: No fever, weight loss, or fatigue  RESPIRATORY: No cough, wheezing, chills or hemoptysis; No shortness of breath  CARDIOVASCULAR: No chest pain, palpitations, dizziness, or leg swelling  GASTROINTESTINAL: No abdominal pain. No nausea, vomiting, or hematemesis; No diarrhea or constipation. No melena or hematochezia.  GENITOURINARY: No dysuria or hematuria, urinary frequency  NEUROLOGICAL: No headaches, memory loss, loss of strength, numbness, or tremors  SKIN: No itching, burning, rashes, or lesions     MEDICATIONS  (STANDING):  ampicillin/sulbactam  IVPB      ampicillin/sulbactam  IVPB 1.5 Gram(s) IV Intermittent every 6 hours  aspirin enteric coated 81 milliGRAM(s) Oral daily  cholecalciferol 1000 Unit(s) Oral daily  enoxaparin Injectable 40 milliGRAM(s) SubCutaneous daily  lidocaine   Patch 2 Patch Transdermal daily  lisinopril 5 milliGRAM(s) Oral daily  mirtazapine 15 milliGRAM(s) Oral at bedtime  sodium chloride 0.9%. 1000 milliLiter(s) (60 mL/Hr) IV Continuous <Continuous>  tamsulosin 0.4 milliGRAM(s) Oral at bedtime    MEDICATIONS  (PRN):  acetaminophen   Tablet .. 650 milliGRAM(s) Oral every 6 hours PRN Moderate Pain (4 - 6)  guaiFENesin   Syrup  (Sugar-Free) 100 milliGRAM(s) Oral every 6 hours PRN Cough      Vital Signs Last 24 Hrs  T(C): 36.4 (07 Jul 2020 07:46), Max: 37 (07 Jul 2020 04:35)  T(F): 97.6 (07 Jul 2020 07:46), Max: 98.6 (07 Jul 2020 04:35)  HR: 65 (07 Jul 2020 07:46) (65 - 87)  BP: 130/45 (07 Jul 2020 07:46) (111/45 - 141/64)  BP(mean): --  RR: 18 (07 Jul 2020 07:46) (18 - 19)  SpO2: 99% (07 Jul 2020 07:46) (97% - 100%)    PHYSICAL EXAMINATION:  GENERAL: NAD, well built  HEAD:  Atraumatic, Normocephalic  EYES:  conjunctiva and sclera clear  NECK: Supple, No JVD, Normal thyroid  CHEST/LUNG: Clear to auscultation. Clear to percussion bilaterally; No rales, rhonchi, wheezing, or rubs  HEART: Regular rate and rhythm; No murmurs, rubs, or gallops  ABDOMEN: Soft, Nontender, Nondistended; Bowel sounds present  NERVOUS SYSTEM:  Alert & Oriented X3,    EXTREMITIES:  2+ Peripheral Pulses, No clubbing, cyanosis, or edema  SKIN: warm dry                          12.3   12.15 )-----------( 324      ( 07 Jul 2020 07:18 )             38.0     07-07    144  |  109<H>  |  15  ----------------------------<  80  3.2<L>   |  26  |  0.63    Ca    8.7      07 Jul 2020 07:18                CAPILLARY BLOOD GLUCOSE      RADIOLOGY & ADDITIONAL TESTS: PGY-1 Progress Note discussed with attending    PAGER #: [[phone]] TILL 5:00 PM  PLEASE CONTACT ON CALL TEAM:  - On Call Team (Please refer to Aide) FROM 5:00 PM - 8:30PM  - Nightfloat Team FROM 8:30 -7:30 AM    CHIEF COMPLAINT & BRIEF HOSPITAL COURSE:  89 y/o male with OA, HTN, Sacral ulcer, afin +PVR was admitted for mechanical fall. He was sleeping deeply in the morning when we rounded, but was responsive when we woke him. He ate well, and had normal bowel and bladder movements. He failed a trial of void yesterday night and a catheter was reinserted to drain around 500 cc of urine. He will possibly be discharged to [facility] with catheter in place.      INTERVAL HPI/OVERNIGHT EVENTS:   Failed trail of void; was recatheterized.       REVIEW OF SYSTEMS:  CONSTITUTIONAL: No fever, weight loss, or fatigue  RESPIRATORY: No cough, wheezing, chills or hemoptysis; No shortness of breath  CARDIOVASCULAR: No chest pain, palpitations, dizziness, or leg swelling  GASTROINTESTINAL: No abdominal pain. No nausea, vomiting, or hematemesis; No diarrhea or constipation. No melena or hematochezia.  GENITOURINARY: No dysuria or hematuria, urinary frequency  NEUROLOGICAL: No headaches, memory loss, loss of strength, numbness, or tremors  SKIN: No itching, burning, rashes, or lesions     MEDICATIONS  (STANDING):  ampicillin/sulbactam  IVPB      ampicillin/sulbactam  IVPB 1.5 Gram(s) IV Intermittent every 6 hours  aspirin enteric coated 81 milliGRAM(s) Oral daily  cholecalciferol 1000 Unit(s) Oral daily  enoxaparin Injectable 40 milliGRAM(s) SubCutaneous daily  lidocaine   Patch 2 Patch Transdermal daily  lisinopril 5 milliGRAM(s) Oral daily  mirtazapine 15 milliGRAM(s) Oral at bedtime  sodium chloride 0.9%. 1000 milliLiter(s) (60 mL/Hr) IV Continuous <Continuous>  tamsulosin 0.4 milliGRAM(s) Oral at bedtime    MEDICATIONS  (PRN):  acetaminophen   Tablet .. 650 milliGRAM(s) Oral every 6 hours PRN Moderate Pain (4 - 6)  guaiFENesin   Syrup  (Sugar-Free) 100 milliGRAM(s) Oral every 6 hours PRN Cough      Vital Signs Last 24 Hrs  T(C): 36.4 (07 Jul 2020 07:46), Max: 37 (07 Jul 2020 04:35)  T(F): 97.6 (07 Jul 2020 07:46), Max: 98.6 (07 Jul 2020 04:35)  HR: 65 (07 Jul 2020 07:46) (65 - 87)  BP: 130/45 (07 Jul 2020 07:46) (111/45 - 141/64)  BP(mean): --  RR: 18 (07 Jul 2020 07:46) (18 - 19)  SpO2: 99% (07 Jul 2020 07:46) (97% - 100%)    PHYSICAL EXAMINATION:  GENERAL: NAD, well built  HEAD:  Atraumatic, Normocephalic  EYES:  conjunctiva and sclera clear  NECK: Supple, No JVD, Normal thyroid  CHEST/LUNG: Clear to auscultation. Clear to percussion bilaterally; No rales, rhonchi, wheezing, or rubs  HEART: Regular rate and rhythm; No murmurs, rubs, or gallops  ABDOMEN: Soft, Nontender, Nondistended; Bowel sounds present, urinary catheter in place  NERVOUS SYSTEM:  Alert & Oriented X3,    EXTREMITIES:  2+ Peripheral Pulses, No clubbing, cyanosis, or edema  SKIN: warm dry                          12.3   12.15 )-----------( 324      ( 07 Jul 2020 07:18 )             38.0     07-07    144  |  109<H>  |  15  ----------------------------<  80  3.2<L>   |  26  |  0.63    Ca    8.7      07 Jul 2020 07:18                CAPILLARY BLOOD GLUCOSE      RADIOLOGY & ADDITIONAL TESTS:

## 2020-07-07 NOTE — PROGRESS NOTE ADULT - PROVIDER SPECIALTY LIST ADULT
Internal Medicine
Pain Medicine
Internal Medicine
Pain Medicine
Internal Medicine

## 2020-07-07 NOTE — CONSULT NOTE ADULT - ATTENDING COMMENTS
Agree with note as written above    - Continue christina catheter  - FU as outpt for trial of void
Thank you for the courtesy of a consultation, please contact me for any additional questions

## 2020-07-07 NOTE — PROGRESS NOTE ADULT - PROBLEM SELECTOR PLAN 2
Patient has dementia and unstable gait.  He does not eat without encouragement. He has done well with a trial of very soft food and thickened liquids. Urine C/S was negative for UTI  Patient has previously been on Flomax 0.4mg QH  Bladder US on 7/4 discovered 964cc of urine and he was catheterized. Subsequently, he failed a trail of void and 500cc of urine was catheterized again.   We will follow KUB US and appreciate the urology consult.

## 2020-07-07 NOTE — PROGRESS NOTE ADULT - ASSESSMENT
Welcome Mildred Sparrow <javascript:void(0)>   Update Personal Info  </[email or web address]/applinks/comdir/personupdate/?self=T> FAQ  </doh2/applinks/cspnp/FAQ> Search Results Help  </doh2/applinks/cspnp/SearchResultsHelp> Help  </doh2/applinks/cspnp/Help>  × If you receive an error when searching the  Registry clear your cache and log back in. Using Chrome: Delete your browsing History. Using Internet Explorer: Clear your browsing data.   Patient Search </doh2/applinks/cspnp/singlePatientSearch>   Multi-Patient Search </doh2/applinks/cspnp/multiPatientSearch>   Reports </doh2/applinks/cspnp/reports>   Drug Listing </doh2/applinks/cspnp/drugListing>   Designation </doh2/applinks/cspnp/designations>   My YVONNE Numbers </doh2/applinks/cspnp/myDeaNumbers>  Data Detail Level: Printer-Friendly View Extended View   Confidential Drug Utilization Report  Search Terms: radha Sifuentesvalente, 04/24/1927   Search Date: 07/03/2020 12:39:25 PM   The Drug Utilization Report below displays all of the controlled substance prescriptions, if any, that your patient has filled in the last twelve months. The information displayed on this report is compiled from pharmacy submissions to the Department, and accurately reflects the information as submitted by the pharmacies.  This report was requested by: Mildred Sparrow | Reference #: [id number]   There are no results for the search terms that you entered.
Patient is a 89y old  Male with PMH of severe OA, CLL, sacral pressure ulcer,  macular degeneration , right hip fracture (3 years ago) BPH, HTN, anxiety and PSHx of cataract surgery who was brought by EMS with chief complaint of mechanical fall due to OA of bilateral knees. Due to new onset AFib with PVR on hospitalization multiple comorbidities, AC has been held. We are optimizing his pain management before discharging to a [facility].
Welcome Mildred Sparrow <javascript:void(0)>   Update Personal Info  </doh2/applinks/comdir/personupdate/?self=T> FAQ  </doh2/applinks/cspnp/FAQ> Search Results Help  </doh2/applinks/cspnp/SearchResultsHelp> Help  </doh2/applinks/cspnp/Help>  × If you receive an error when searching the  Registry clear your cache and log back in. Using Chrome: Delete your browsing History. Using Internet Explorer: Clear your browsing data.   Patient Search </doh2/applinks/cspnp/singlePatientSearch>   Multi-Patient Search </doh2/applinks/cspnp/multiPatientSearch>   Reports </doh2/applinks/cspnp/reports>   Drug Listing </doh2/applinks/cspnp/drugListing>   Designation </doh2/applinks/cspnp/designations>   My YVONNE Numbers </doh2/applinks/cspnp/myDeaNumbers>  Data Detail Level: Printer-Friendly View Extended View   Confidential Drug Utilization Report  Search Terms: radha Sifuentesvalente, 04/24/1927   Search Date: 07/03/2020 12:39:25 PM   The Drug Utilization Report below displays all of the controlled substance prescriptions, if any, that your patient has filled in the last twelve months. The information displayed on this report is compiled from pharmacy submissions to the Department, and accurately reflects the information as submitted by the pharmacies.  This report was requested by: Mildred Sparrow | Reference #: [id number]   There are no results for the search terms that you entered.
Patient is a 89y old  Male with PMH of severe OA, CLL, sacral pressure ulcer,  macular degeneration , right hip fracture (3 years ago) BPH, HTN, anxiety and PSHx of cataract surgery who was brought by EMS with chief complaint of mechanical fall due to OA of bilateral knees. Due to new onset AFib with PVR on hospitalization multiple comorbidities, AC has been held. We are optimizing his pain management before discharging to a [facility].
Patient is a 89y old  Male with PMH of severe OA, CLL, sacral pressure ulcer,  macular degeneration , right hip fracture (3 years ago) BPH, HTN, anxiety and PSHx of cataract surgery who was brought by EMS with chief complaint of mechanical fall due to OA of bilateral knees. Due to new onset AFib with PVR on hospitalization multiple comorbidities, AC has been held. We are optimizing his pain management before discharging to a [facility].
89 y M from home , lives with daughter , ambulates with walker PMH of severe OA, CLL, sacral pressure ulcer,  macular degeneration, right hip fracture (3 years ago) BPH, HTN, anxiety PSH cataract surgery presented s/p mechanical fall.
Patient is a 89y old  Male with PMH of severe OA, CLL, sacral pressure ulcer,  macular degeneration , right hip fracture (3 years ago) BPH, HTN, anxiety and PSHx of cataract surgery who was brought by EMS with chief complaint of mechanical fall due to OA of bilateral knees. Due to new onset AFib with PVR on hospitalization multiple comorbidities, AC has been held. We are optimizing his pain management before discharging to a [facility].
Patient is a 89y old  Male with PMH of severe OA, CLL, sacral pressure ulcer,  macular degeneration , right hip fracture (3 years ago) BPH, HTN, anxiety and PSHx of cataract surgery who was brought by EMS with chief complaint of mechanical fall due to OA of bilateral knees. Due to new onset AFib with PVR on hospitalization multiple comorbidities, he may not be a full
Patient is a 89y old  Male with PMH of severe OA, CLL, sacral pressure ulcer,  macular degeneration , right hip fracture (3 years ago) BPH, HTN, anxiety and PSHx of cataract surgery who was brought by EMS with chief complaint of mechanical fall due to OA of bilateral knees. Due to new onset AFib with PVR on hospitalization multiple comorbidities, he may not be a full

## 2020-07-07 NOTE — PROGRESS NOTE ADULT - PROBLEM SELECTOR PROBLEM 5
Atrial fibrillation, unspecified type Osteoarthritis of multiple joints, unspecified osteoarthritis type

## 2020-07-07 NOTE — PROGRESS NOTE ADULT - REASON FOR ADMISSION
Mechanical Fall
fall
S/p Fall
Mechanical Fall
Mechanical Fall
NEGATIVE

## 2020-07-07 NOTE — PROGRESS NOTE ADULT - ATTENDING COMMENTS
MEDICAL ATTENDING NOTE    Patient is a 89y old  Male who presents with a chief complaint of Mechanical Fall (07 Jul 2020 08:48)      INTERVAL HPI/OVERNIGHT EVENTS: offers no new complaints today    MEDICATIONS  (STANDING):  aspirin enteric coated 81 milliGRAM(s) Oral daily  cholecalciferol 1000 Unit(s) Oral daily  enoxaparin Injectable 40 milliGRAM(s) SubCutaneous daily  lidocaine   Patch 2 Patch Transdermal daily  lisinopril 5 milliGRAM(s) Oral daily  mirtazapine 15 milliGRAM(s) Oral at bedtime  potassium chloride    Tablet ER 40 milliEquivalent(s) Oral every 4 hours  sodium chloride 0.9%. 1000 milliLiter(s) (60 mL/Hr) IV Continuous <Continuous>  tamsulosin 0.4 milliGRAM(s) Oral at bedtime    MEDICATIONS  (PRN):  acetaminophen   Tablet .. 650 milliGRAM(s) Oral every 6 hours PRN Moderate Pain (4 - 6)  guaiFENesin   Syrup  (Sugar-Free) 100 milliGRAM(s) Oral every 6 hours PRN Cough      __________________________________________________  ----------------------------------------------------------------------------------  REVIEW OF SYSTEMS: negative      Vital Signs Last 24 Hrs  T(C): 36.4 (07 Jul 2020 11:32), Max: 37 (07 Jul 2020 04:35)  T(F): 97.6 (07 Jul 2020 11:32), Max: 98.6 (07 Jul 2020 04:35)  HR: 61 (07 Jul 2020 11:32) (61 - 87)  BP: 143/52 (07 Jul 2020 11:32) (118/59 - 143/52)  BP(mean): --  RR: 18 (07 Jul 2020 11:32) (18 - 19)  SpO2: 100% (07 Jul 2020 11:32) (97% - 100%)    _________________  PHYSICAL EXAM:  ---------------------------   NAD; Normocephalic;   LUNGS - no wheezing  HEART: S1 S2+   ABDOMEN: Soft, Nontender, non distended  EXTREMITIES: no cyanosis; no edema  NERVOUS SYSTEM:  Awake and alert; no focal neuro deficits appreciated    _________________________________________________  LABS:                        12.3   12.15 )-----------( 324      ( 07 Jul 2020 07:18 )             38.0     07-07    144  |  109<H>  |  15  ----------------------------<  80  3.2<L>   |  26  |  0.63    Ca    8.7      07 Jul 2020 07:18      Pt seen and evaluated by bedside.     - Renal function improved with hydration. Howver, pt unable to pass voiding trial. catheter output 500cc. Obtain pelvic U/S and Urology consult.   - replace K+  - Pt otherwise ok for dc.       Plan of care was discussed with patient ; all questions and concerns were addressed and care was aligned with patient's wishes.

## 2020-07-07 NOTE — CONSULT NOTE ADULT - SUBJECTIVE AND OBJECTIVE BOX
89 y M from home , lives with daughter , ambulates with walker PMH of severe OA, CLL, sacral pressure ulcer,  macular degeneration , right hip fracture (3 years ago) BPH, HTN, anxiety PSH  cataract surgery presented sp mechanical fall , no head trauma , no LOC, no prodromal symptoms or seizure activity , no focal motor sensory deficits. Patient is A&Ox2. History of aggravation in his bilateral knee osteoarthritis pain during past few humid climate. Patient buckled his knees upon standing and fell on his buttock. Patient was c/o b/l knee pain. Otherwise ROS was unremarkable  Urology called due to urinary retention, christina was placed on admission and 1000cc noted in bladder. Pt failed TOV yesterday with 500cc pvr noted. Pt usually takes flomax and is taking this admission.  Daughter gives hx of voiding s- difficulty at home. 2 years ago nocturia x3. Pt given flomax and told not to drink before sleep. this improved his nocturia to 1x. No hematuria. no  surgical hx. No FHx Ca K,P,b.    ICU Vital Signs Last 24 Hrs  T(C): 36.4 (07 Jul 2020 11:32), Max: 37 (07 Jul 2020 04:35)  T(F): 97.6 (07 Jul 2020 11:32), Max: 98.6 (07 Jul 2020 04:35)  HR: 68 (07 Jul 2020 12:35) (61 - 87)  BP: 127/49 (07 Jul 2020 12:35) (119/56 - 143/52)  BP(mean): 69 (07 Jul 2020 12:35) (63 - 69)  ABP: --  ABP(mean): --  RR: 18 (07 Jul 2020 11:32) (18 - 19)  SpO2: 100% (07 Jul 2020 12:35) (98% - 100%)    Abd: soft nt nd no cvat.  testis descended, atrophic bilat, nt no erythema or masses. Uncirc male. no lesions. Christina with yellow urine in bag.  Prostate: 25gm, firm, flat, nt.                          12.3   12.15 )-----------( 324      ( 07 Jul 2020 07:18 )             38.0     07-07    144  |  109<H>  |  15  ----------------------------<  80  3.2<L>   |  26  |  0.63    Ca    8.7      07 Jul 2020 07:18    < from:  Kidney and Bladder (07.07.20 @ 11:14) >    Impression:    No hydronephrosis.  Distended urinary bladder as discussed.    < end of copied text >

## 2020-07-07 NOTE — PROGRESS NOTE ADULT - PROBLEM SELECTOR PLAN 4
-Pain management consult for chronic OA is appreciated  Recommended lidocaine patches with Tylenol daily   He reports only occasional intake of tylenol for knee pain; no previous steroid injections.  - PT recommends that he is a fall risk and is thus going to a subacute rehab.  X-ray of R knee demonstrates severe signs of OA, narrowing, and ostepenia  Vitamin D 1000U daily has been started -Pain management consult for chronic OA is appreciated  Recommended lidocaine patches with Tylenol daily   He reports only occasional intake of tylenol for knee pain at home; no previous steroid injections.  - PT recommends that he is a fall risk and is thus going to a subacute rehab.  X-ray of R knee demonstrates severe signs of OA, narrowing, and ostepenia  Vitamin D 1000U daily has been started patient p/w mechanical fall 2/2 severe OA  Xray pelvis negative for acute fractures  CT head negative for intracranial pathology  Vitmain D supplementation  Fall Precaution

## 2020-07-07 NOTE — PROGRESS NOTE ADULT - PROBLEM SELECTOR PLAN 3
patient p/w mechanical fall 2/2 severe OA  Xray pelvis negative for acute fractures  CT head negative for intracranial pathology  Vitmain D supplementation  Fall Precaution Patient has dementia and unstable gait.  He does not eat without encouragement. He has done well with a trial of very soft food and thickened liquids.

## 2020-07-07 NOTE — PROGRESS NOTE ADULT - PROBLEM SELECTOR PLAN 1
Pt with pain in b/l knees which is somatic in nature due to osteoarthritis.   High risk medications reviewed. Avoid polypharmacy. Avoid IV opioids. Avoid NSAIDs and benzodiazepines. Non-pharmacological sleep aides initiated. Non-opioid medications and non-pharmacological pain management measures initiated.   Opioid pain recommendations   - Do not recommend opioids. Maximize non-opioid pain recommendations  Non-opioid pain recommendations   - Acetaminophen 650mg PO q 6 hours PRN moderate pain  - Continue Lidoderm 5% patch 2 patches daily.   Bowel Regimen  - Per primary team  Mild pain   - Non-pharmacological pain treatment recommendations  - Warm/ Cool packs PRN   - Repositioning extremity, elevation, imagery, relaxation, distraction.  - Physical therapy OOB if no contraindications   Recommendations discussed with primary team and RN

## 2020-07-07 NOTE — PROGRESS NOTE ADULT - PROBLEM SELECTOR PLAN 6
patient on lisinopril at home   - will resume   - monitor BP New onset A-fib with PVR  - HAS BLED score: 2 moderate risk for major bleeding   - JEFRY VASC score : 3   - will start ASA   TSH is normal   Telemetry continues to show PVR <100 bpm  Will continue to trend troponin I (slightly borderline) --> 0.015; 0.017  We appreciate the consultation from Dr Arenas cardiology - Recommended to d/c metoprolol as rate is continuously <100 bpm and  d/c Anticoagulation for now and defer decision to OP basis in view of history of fall and advanced age    Awaiting TTE

## 2020-07-07 NOTE — PROGRESS NOTE ADULT - SUBJECTIVE AND OBJECTIVE BOX
Source of information: JARAD HENAO, Chart review  Patient language: English  : n/a    HPI:  89 y M from home , lives with daughter , ambulates with walker PMH of severe OA, CLL, sacral pressure ulcer,  macular degeneration , right hip fracture (3 years ago) BPH, HTN, anxiety PSH  cataract surgery presented sp mechanical fall , no head trauma , no LOC, no prodromal symptoms or seizure activity , no focal motor sensory deficit. On my evaluation patient is AAOX1-2. History mostly taken from daughter who reported aggravation in his bilateral knee osteoarthritis pain during past few humid climate. Patient buckled his knees upon standing and fell on his buttock. Patient was c/o b/l knee pain. Otherwise ROS was unremarkable    ED course : ECG was significant for new onset Afib with PVC , rate is controlled below 100   GOC: DNR/DNI (01 Jul 2020 17:55)      This is a Patient is a 89y old  Male who presents with a chief complaint of fall after knee buckled. Pt seen and examined at bedside. Laying in bed, NAD. Pt denies pain at this time. Pt reports lidocaine patches and warm packs help alleviate his pain. Plan is to be discharged to a [facility]. Pt tolerating PO diet. Pt denies lethargy, nausea, vomiting, constipation and itchiness. Reports ambulated with a walker in hallway. Patient stated goal for pain control: to be able to get out of bed to chair and turn in bed with tolerable pain control. Ambulates at home with walker. Lives with daughter.     PAST MEDICAL & SURGICAL HISTORY:  Macular degeneration of both eyes, unspecified type  Leukemia, chronic lymphocytic  Anemia due to other cause  Osteoarthritis of multiple joints, unspecified osteoarthritis type  History of hypertension  Broken internal right hip prosthesis, subsequent encounter    Social History:  Non smoker, No ETOH , no ilicit drugs (01 Jul 2020 17:55)   [X]Denies ETOH use, illicit drug use, and smoking     Allergies    No Known Allergies      MEDICATIONS  (STANDING):  aspirin enteric coated 81 milliGRAM(s) Oral daily  cholecalciferol 1000 Unit(s) Oral daily  enoxaparin Injectable 40 milliGRAM(s) SubCutaneous daily  lidocaine   Patch 2 Patch Transdermal daily  lisinopril 5 milliGRAM(s) Oral daily  mirtazapine 15 milliGRAM(s) Oral at bedtime  potassium chloride    Tablet ER 40 milliEquivalent(s) Oral every 4 hours  sodium chloride 0.9%. 1000 milliLiter(s) (60 mL/Hr) IV Continuous <Continuous>  tamsulosin 0.4 milliGRAM(s) Oral at bedtime    MEDICATIONS  (PRN):  acetaminophen   Tablet .. 650 milliGRAM(s) Oral every 6 hours PRN Moderate Pain (4 - 6)  guaiFENesin   Syrup  (Sugar-Free) 100 milliGRAM(s) Oral every 6 hours PRN Cough      Vital Signs Last 24 Hrs  T(C): 36.4 (07 Jul 2020 07:46), Max: 37 (07 Jul 2020 04:35)  T(F): 97.6 (07 Jul 2020 07:46), Max: 98.6 (07 Jul 2020 04:35)  HR: 65 (07 Jul 2020 07:46) (65 - 87)  BP: 130/45 (07 Jul 2020 07:46) (111/45 - 141/64)  BP(mean): --  RR: 18 (07 Jul 2020 07:46) (18 - 19)  SpO2: 99% (07 Jul 2020 07:46) (97% - 100%)  COVID-19 PCR: NotDetec (06 Jul 2020 11:20)    LABS: Reviewed                          12.3   12.15 )-----------( 324      ( 07 Jul 2020 07:18 )             38.0     07-07    144  |  109<H>  |  15  ----------------------------<  80  3.2<L>   |  26  |  0.63    Ca    8.7      07 Jul 2020 07:18            CAPILLARY BLOOD GLUCOSE        COVID-19 PCR: NotDetec (06 Jul 2020 11:20)      Radiology: Reviewed  < from: CT Head No Cont (07.02.20 @ 14:35) >  EXAM:  CT BRAIN                            PROCEDURE DATE:  07/02/2020          INTERPRETATION:  .    CLINICAL INFORMATION: Status post fall.    TECHNIQUE: Multiple axial CT images of the head were obtained without contrast. Sagittal and coronal reconstructed images were acquired from the source data.    COMPARISON: No prior CT studies of the brain are available for comparison at this institution.    FINDINGS: There is no acute intracranial hemorrhage, mass effect, shift of the midline structures, herniation, extra-axial fluid collection, or hydrocephalus.    Disproportionate bilateral medial temporal lobe atrophy is seen with dilatation of the temporal horns, right worse than left.    There is diffuse cerebral volume loss with prominence of the sulci, fissures, and cisternal spaces which is normal for the patient's age. There is mild deep and periventricular white matter hypoattenuation statistically compatible with microvascular changes given calcific atherosclerotic disease of the intracranial arteries.    The paranasal sinuses and mastoid air cells are clear. The calvarium is intact. There is evidence of right-sided cataract removal. Bilateral senile scleral plaques are noted. Mild bilateral temporalis muscle atrophy is seen bilaterally.    IMPRESSION: No acute intracranial hemorrhage, mass effect, or shift of the midline structures.    Mild chronic white matter microvascular type changes.                SONNY PEREZ M.D., ATTENDING RADIOLOGIST  This document has been electronically signed. Jul 2 2020  2:45PM                < end of copied text >    < from: Xray Chest 1 View- PORTABLE-Routine (07.03.20 @ 22:44) >      < end of copied text >      ORT Score -   Family Hx of substance abuse	Female	      Male  Alcohol 	                                           1                     3  Illegal drugs	                                   2                     3  Rx drugs                                           4 	                  4  Personal Hx of substance abuse		  Alcohol 	                                          3	                  3  Illegal drugs                                     4	                  4  Rx drugs                                            5 	                  5  Age between 16- 45 years	           1                     1  hx preadolescent sexual abuse	   3 	                  0  Psychological disease		  ADD, OCD, bipolar, schizophrenia   2	          2  Depression                                           1 	          1  Total: 0    a score of 3 or lower indicates low risk for opioid abuse		  a score of 4-7 indicates moderate risk for opioid abuse		  a score of 8 or higher indicates high risk for opioid abuse      4AT (Assessment test for delirium & cognitive impairment)  _________________________________________________________  [1] ALERTNESS  This includes patients who may be markedly drowsy (eg. difficult to rouse and/or obviously sleepy  during assessment) or agitated/hyperactive. Observe the patient. If asleep, attempt to wake with  speech or gentle touch on shoulder. Ask the patient to state their name and address to assist rating.  Normal (fully alert, but not agitated, throughout assessment) 0  Mild sleepiness for <10 seconds after waking, then normal 0  Clearly abnormal 4    [2] AMT4  Age, date of birth, place (name of the hospital or building), current year.  No mistakes 0  1 mistake 1  2 or more mistakes/untestable 2    [3] ATTENTION  Ask the patient: “Please tell me the months of the year in backwards order, starting at December.”  To assist initial understanding one prompt of “what is the month before December?” is permitted.  Months of the year backwards Achieves 7 months or more correctly 0  Starts but scores <7 months / refuses to start 1   Untestable (cannot start because unwell, drowsy, inattentive) 2    [4] ACUTE CHANGE OR FLUCTUATING COURSE  Evidence of significant change or fluctuation in: alertness, cognition, other mental function  (eg. paranoia, hallucinations) arising over the last 2 weeks and still evident in last 24hrs  No 0  Yes 4    4 or above: possible delirium +/- cognitive impairment  1-3: possible cognitive impairment  0: delirium or severe cognitive impairment unlikely (but delirium still possible if [4] information incomplete)    4AT SCORE: 3      REVIEW OF SYSTEMS:  CONSTITUTIONAL: No fever or fatigue  RESPIRATORY: No cough, wheezing, chills or hemoptysis; No shortness of breath  CARDIOVASCULAR: No chest pain, palpitations, dizziness, or leg swelling  GASTROINTESTINAL: No abdominal or epigastric pain. No nausea, vomiting; No diarrhea or constipation.   GENITOURINARY: Urinary catheter in place  MUSCULOSKELETAL: Positive Rt knee joint pain. No swelling; No muscle, back, or extremity pain, no upper or lower motor strength weakness, no saddle anesthesia, bowel/bladder incontinence, no falls   NEURO: No headaches, No numbness/tingling b/l LE, No weakness  PSYCHIATRIC: Positive anxiety, No depression, mood swings, or difficulty sleeping    PHYSICAL EXAM:  GENERAL:  Alert & Oriented X2, NAD, Good concentration  CHEST/LUNG: Clear to auscultation bilaterally; No rales, rhonchi, wheezing, or rubs  HEART: Regular rate and rhythm; No murmurs, rubs, or gallops  ABDOMEN: Soft, Nontender, Nondistended; Bowel sounds present  GENITOURINARY: Urinary catheter in place  EXTREMITIES:  Rt knee tender to palpation. 2+ Peripheral Pulses, No cyanosis, or edema  MUSCULOSKELETAL: Motor Strength 5/5 B/L upper and lower extremities; moves all extremities equally against gravity; ROM intact; negative SLR  SKIN: No rashes or lesions    Risk factors associated with adverse outcomes related to opioid treatment  [ ]  Concurrent benzodiazepine use  [ ]  History/ Active substance use or alcohol use disorder  [X ] Psychiatric co-morbidity  [ ] Sleep apnea  [ ] COPD  [ ] BMI> 35  [ ] Liver dysfunction  [ ] Renal dysfunction  [ ] CHF  [ ] Smoker  [X ]  Age > 60 years    [X ]  [place]  Reviewed and Copied to Chart. See below.    Plan of care and goal oriented pain management treatment options were discussed with patient and /or primary care giver; all questions and concerns were addressed and care was aligned with patient's wishes.    Educated patient on goal oriented pain management treatment options     07-07-20 @ 10:57

## 2020-07-07 NOTE — ADVANCED PRACTICE NURSE CONSULT - RECOMMEDATIONS
-Clean all wounds with normal saline and apply skin prep to the surrounding skin  -Apply TRIAD Moisture Barrier Cream to the Bilateral Gluteus, R. Post Thigh, and Coccyx areas b.i.d. PRN  -Apply Bacitracin ointment to the R. Elbow wound bed and cover with a non-adherent dry dressing Daily PRN  -Elevate/float the patients heels using heel protectors and reposition the patient Q 2hrs using wedges or pillows

## 2020-07-07 NOTE — PROGRESS NOTE ADULT - PROBLEM SELECTOR PLAN 7
Patient on Flomax 0.4mg qh   - will continue  Urine C/S was negative for UTI patient on lisinopril at home   - will resume   - monitor BP

## 2020-07-07 NOTE — DISCHARGE NOTE NURSING/CASE MANAGEMENT/SOCIAL WORK - PATIENT PORTAL LINK FT
You can access the FollowMyHealth Patient Portal offered by [facility] by registering at the following website: [email or web address]. By joining [facility]’s FollowMyHealth portal, you will also be able to view your health information using other applications (apps) compatible with our system.

## 2020-07-07 NOTE — ADVANCED PRACTICE NURSE CONSULT - ASSESSMENT
This is a 89yr old male patient admitted for A. Fib, presenting with the following:  -There is evidence of a Skin Tear to the R. Elbow (1cm x 0.4cm x 0.2cm) with pink tissue, pale tissue, scant drainage, and surrounding tissue ecchymosis  -There is a Stage 2 Pressure Injury to the Bilateral Gluteus, R. Post Thigh, and Coccyx areas with red tissue and scant drainage This is a 89yr old male patient admitted for A. Fib, presenting with the following:  -There is evidence of a Skin Tear to the R. Elbow (1cm x 0.4cm x 0.2cm) with pink tissue, pale tissue, scant drainage, and surrounding tissue ecchymosis  -There is a Stage 2 Pressure Injury to the Bilateral Gluteus, R. Post Thigh, and Coccyx areas with red tissue and scant drainage  -There is evidence of blanchable erythema and ecchymosis to the Back area

## 2020-07-07 NOTE — PROGRESS NOTE ADULT - PROBLEM SELECTOR PLAN 9
VT score 2   cw lovenox 40 mg qd  ASA 81mg  Diet was changed to pureed/ thickened liquids +nectar in view of heightened aspiration risk. He can tolerate this well without coughing.   Speech and swallow was consulted in view of aspiration risk (productive cough in elderly man)

## 2020-07-07 NOTE — PROGRESS NOTE ADULT - PROBLEM SELECTOR PLAN 5
New onset A-fib with PVR  - HAS BLED score: 2 moderate risk for major bleeding   - JEFRY VASC score : 3   - will start ASA   TSH is normal   Telemetry continues to show PVR <100 bpm  Will continue to trend troponin I (slightly borderline) --> 0.015; 0.017  We appreciate the consultation from Dr Arenas cardiology - Recommended to d/c metoprolol as rate is continuously <100 bpm and  d/c Anticoagulation for now and defer decision to OP basis in view of history of fall and advanced age    Awaiting TTE -Pain management consult for chronic OA is appreciated  Recommended lidocaine patches with Tylenol daily   He reports only occasional intake of tylenol for knee pain at home; no previous steroid injections.  - PT recommends that he is a fall risk and is thus going to a subacute rehab.  X-ray of R knee demonstrates severe signs of OA, narrowing, and ostepenia  Vitamin D 1000U daily has been started

## 2020-07-07 NOTE — PROGRESS NOTE ADULT - PROBLEM SELECTOR PLAN 1
Dementia work up   B12 , folate, RPR was negative   CTH  Ammonia ordered to r/o acute enceph    - Due to progressive needs, will  arrange with  for discharge to [facility]

## 2021-03-31 NOTE — ED ADULT NURSE NOTE - NS ED NURSE PRESS ULCER STAGE 12
Writer faxed form to [employer]   [phone]  Waiting for confimration  Confirmed  Fax complete    stage II

## 2024-09-06 NOTE — DIETITIAN INITIAL EVALUATION ADULT. - PERTINENT LABORATORY DATA
07-06 Na146 mmol/L<H> Glu 81 mg/dL K+ 3.8 mmol/L Cr  0.62 mg/dL BUN 20 mg/dL<H>   07-02 Phos 3.2 mg/dL   07-03 Alb 4.0 g/dL       07-02 Chol 170 mg/dL LDL 91 mg/dL HDL 70 mg/dL Trig 44 mg/dL  07-02-20 @ 10:17 HgbA1C 5.2 [4.0 - 5.6] No